# Patient Record
Sex: FEMALE | Race: OTHER | Employment: FULL TIME | URBAN - METROPOLITAN AREA
[De-identification: names, ages, dates, MRNs, and addresses within clinical notes are randomized per-mention and may not be internally consistent; named-entity substitution may affect disease eponyms.]

---

## 2019-04-27 ENCOUNTER — HOSPITAL ENCOUNTER (EMERGENCY)
Age: 34
Discharge: HOME | End: 2019-04-27
Attending: EMERGENCY MEDICINE
Payer: COMMERCIAL

## 2019-04-27 ENCOUNTER — APPOINTMENT (OUTPATIENT)
Dept: GENERAL RADIOLOGY | Age: 34
End: 2019-04-27
Attending: EMERGENCY MEDICINE
Payer: COMMERCIAL

## 2019-04-27 VITALS
HEIGHT: 64 IN | OXYGEN SATURATION: 98 % | TEMPERATURE: 98 F | HEART RATE: 78 BPM | BODY MASS INDEX: 32.44 KG/M2 | RESPIRATION RATE: 18 BRPM | DIASTOLIC BLOOD PRESSURE: 83 MMHG | WEIGHT: 190 LBS | SYSTOLIC BLOOD PRESSURE: 114 MMHG

## 2019-04-27 DIAGNOSIS — S93.401A MODERATE RIGHT ANKLE SPRAIN, INITIAL ENCOUNTER: Primary | ICD-10-CM

## 2019-04-27 PROCEDURE — 99283 EMERGENCY DEPT VISIT LOW MDM: CPT

## 2019-04-27 PROCEDURE — 73610 X-RAY EXAM OF ANKLE: CPT

## 2019-04-27 PROCEDURE — 74011250637 HC RX REV CODE- 250/637: Performed by: EMERGENCY MEDICINE

## 2019-04-27 PROCEDURE — L4350 ANKLE CONTROL ORTHO PRE OTS: HCPCS

## 2019-04-27 RX ORDER — NAPROXEN 500 MG/1
500 TABLET ORAL 2 TIMES DAILY WITH MEALS
Qty: 20 TAB | Refills: 0 | Status: SHIPPED | OUTPATIENT
Start: 2019-04-27 | End: 2020-01-13

## 2019-04-27 RX ORDER — HYDROCODONE BITARTRATE AND ACETAMINOPHEN 5; 325 MG/1; MG/1
1 TABLET ORAL
Qty: 15 TAB | Refills: 0 | Status: SHIPPED | OUTPATIENT
Start: 2019-04-27 | End: 2019-05-02

## 2019-04-27 RX ORDER — HYDROCODONE BITARTRATE AND ACETAMINOPHEN 5; 325 MG/1; MG/1
1 TABLET ORAL
Status: DISCONTINUED | OUTPATIENT
Start: 2019-04-27 | End: 2019-04-27 | Stop reason: SDUPTHER

## 2019-04-27 RX ORDER — HYDROCODONE BITARTRATE AND ACETAMINOPHEN 5; 325 MG/1; MG/1
1 TABLET ORAL
Status: COMPLETED | OUTPATIENT
Start: 2019-04-27 | End: 2019-04-27

## 2019-04-27 RX ADMIN — HYDROCODONE BITARTRATE AND ACETAMINOPHEN 1 TABLET: 5; 325 TABLET ORAL at 04:59

## 2019-04-27 NOTE — ED NOTES
Provider has reviewed discharge instructions with the patient and spouse. The patient verbalized understanding.

## 2019-04-27 NOTE — ED PROVIDER NOTES
The patient is a 79-year-old female without any significant past medical history, who presents to the ED for evaluation for right ankle pain and injury sustained yesterday. The patient slipped and fell on her right side, twisting her right ankle in the process. The patient was able to get back up and felt a sharp pain in the right lower extremity. However, she was able to ambulate. Throughout this evening, her discomfort worsen, and she hasn't been able to put any weight in her right ankle. She denies any headache, neck, and back pain, chest pain, shortness of breath, nausea, vomiting, abdominal pain, dizziness, weakness, and numbness. No past medical history on file. No past surgical history on file. No family history on file. Social History Socioeconomic History  Marital status:  Spouse name: Not on file  Number of children: Not on file  Years of education: Not on file  Highest education level: Not on file Occupational History  Not on file Social Needs  Financial resource strain: Not on file  Food insecurity:  
  Worry: Not on file Inability: Not on file  Transportation needs:  
  Medical: Not on file Non-medical: Not on file Tobacco Use  Smoking status: Not on file Substance and Sexual Activity  Alcohol use: Not on file  Drug use: Not on file  Sexual activity: Not on file Lifestyle  Physical activity:  
  Days per week: Not on file Minutes per session: Not on file  Stress: Not on file Relationships  Social connections:  
  Talks on phone: Not on file Gets together: Not on file Attends [employer] service: Not on file Active member of club or organization: Not on file Attends meetings of clubs or organizations: Not on file Relationship status: Not on file  Intimate partner violence:  
  Fear of current or ex partner: Not on file Emotionally abused: Not on file Physically abused: Not on file Forced sexual activity: Not on file Other Topics Concern  Not on file Social History Narrative  Not on file ALLERGIES: Patient has no allergy information on record. Review of Systems All other systems reviewed and are negative. Vitals:  
 04/27/19 9517 BP: 114/83 Pulse: 78 Resp: 18 Temp: 98 °F (36.7 °C) SpO2: 98% Weight: 86.2 kg (190 lb) Height: 5' 4\" (1.626 m) Physical Exam  
Musculoskeletal:  
     Right ankle: She exhibits decreased range of motion, swelling and ecchymosis. She exhibits no deformity, no laceration and normal pulse. Tenderness. Lateral malleolus tenderness found. No medial malleolus, no AITFL, no CF ligament, no posterior TFL, no head of 5th metatarsal and no proximal fibula tenderness found. Achilles tendon normal.  
     Feet: 
 
Nursing note and vitals reviewed. CONSTITUTIONAL: Well-appearing; well-nourished; in no apparent distress HEAD: Normocephalic; atraumatic EYES: PERRL; EOM intact; conjunctiva and sclera are clear bilaterally. ENT: No rhinorrhea; normal pharynx with no tonsillar hypertrophy; mucous membranes pink/moist, no erythema, no exudate. NECK: Supple; non-tender; no cervical lymphadenopathy CARD: Normal S1, S2; no murmurs, rubs, or gallops. Regular rate and rhythm. RESP: Normal respiratory effort; breath sounds clear and equal bilaterally; no wheezes, rhonchi, or rales. ABD: Normal bowel sounds; non-distended; non-tender; no palpable organomegaly, no masses, no bruits. Back Exam: Normal inspection; no vertebral point tenderness, no CVA tenderness. Normal range of motion. SKIN: Warm; dry; no rash. NEURO:Alert and oriented x 3, coherent, AUGUSTO-XII grossly intact, sensory and motor are non-focal. 
 
 
MDM Number of Diagnoses or Management Options Diagnosis management comments: Assessment: left ankle injury rule out fracture-suspect strain Plan: education, and reassurance, symptomatic treatment/ x-ray of the right ankle/ analgesia/ splint/ crutches/ Monitor and Reevaluate. Amount and/or Complexity of Data Reviewed Clinical lab tests: ordered and reviewed Tests in the radiology section of CPT®: ordered and reviewed Tests in the medicine section of CPT®: reviewed and ordered Discussion of test results with the performing providers: yes Decide to obtain previous medical records or to obtain history from someone other than the patient: yes Obtain history from someone other than the patient: yes Review and summarize past medical records: yes Discuss the patient with other providers: yes Independent visualization of images, tracings, or specimens: yes Risk of Complications, Morbidity, and/or Mortality Presenting problems: moderate Diagnostic procedures: moderate Management options: moderate Procedures XRAY INTERPRETATION (ED MD) Xray of Right ankle shows STS Moderatno fracture. No subluxation/dislocation. No bony abnormality. Dominick Blackburn MD 4:44 AM 
 
Splint applied by Oskar Griffin MD.  Neurovascular status intact post splint application. Progress Note:  
Pt has been reexamined by Oskar Griffin MD. Pt is feeling much better. Symptoms have improved. All available results have been reviewed with pt and any available family. Pt understands sx, dx, and tx in ED. Care plan has been outlined and questions have been answered. Pt is ready to go home. Will send home on right ankle sprain instructions. Prescription of naproxen, and norco. Splint and crutches instruction. Outpatient referral with PCP as needed. Written by Oskar Griffin MD,4:44 AM 
 
. Prakash Goldman

## 2019-04-27 NOTE — ED TRIAGE NOTES
Pt fell yesterday morning at 7am while wearing heels. All day some pain but was able to walk. Iced when got home and took Ibuprofen 600mg. Woke up to swollen ankle with more pain, unable to put pressure on it. Right ankle swollen with some redness.

## 2019-04-27 NOTE — DISCHARGE INSTRUCTIONS
Patient Education        Ankle Sprain: Care Instructions  Your Care Instructions    An ankle sprain can happen when you twist your ankle. The ligaments that support the ankle can get stretched and torn. Often the ankle is swollen and painful. Ankle sprains may take from several weeks to several months to heal. Usually, the more pain and swelling you have, the more severe your ankle sprain is and the longer it will take to heal. You can heal faster and regain strength in your ankle with good home treatment. It is very important to give your ankle time to heal completely, so that you do not easily hurt your ankle again. Follow-up care is a key part of your treatment and safety. Be sure to make and go to all appointments, and call your doctor if you are having problems. It's also a good idea to know your test results and keep a list of the medicines you take. How can you care for yourself at home? · Prop up your foot on pillows as much as possible for the next 3 days. Try to keep your ankle above the level of your heart. This will help reduce the swelling. · Follow your doctor's directions for wearing a splint or elastic bandage. Wrapping the ankle may help reduce or prevent swelling. · Your doctor may give you a splint, a brace, an air stirrup, or another form of ankle support to protect your ankle until it is healed. Wear it as directed while your ankle is healing. Do not remove it unless your doctor tells you to. After your ankle has healed, ask your doctor whether you should wear the brace when you exercise. · Put ice or cold packs on your injured ankle for 10 to 20 minutes at a time. Try to do this every 1 to 2 hours for the next 3 days (when you are awake) or until the swelling goes down. Put a thin cloth between the ice and your skin. · You may need to use crutches until you can walk without pain. If you do use crutches, try to bear some weight on your injured ankle if you can do so without pain.  This helps the ankle heal.  · Take pain medicines exactly as directed. ? If the doctor gave you a prescription medicine for pain, take it as prescribed. ? If you are not taking a prescription pain medicine, ask your doctor if you can take an over-the-counter medicine. · If you have been given ankle exercises to do at home, do them exactly as instructed. These can promote healing and help prevent lasting weakness. When should you call for help? Call your doctor now or seek immediate medical care if:    · Your pain is getting worse.     · Your swelling is getting worse.     · Your splint feels too tight or you are unable to loosen it.    Watch closely for changes in your health, and be sure to contact your doctor if:    · You are not getting better after 1 week. Where can you learn more? Go to http://milena-cristal.info/. Enter U964 in the search box to learn more about \"Ankle Sprain: Care Instructions. \"  Current as of: September 20, 2018  Content Version: 11.9  © 4840-0942 [employer]. Care instructions adapted under license by [employer] (which disclaims liability or warranty for this information). If you have questions about a medical condition or this instruction, always ask your healthcare professional. [address] any warranty or liability for your use of this information.

## 2019-04-27 NOTE — LETTER
[address] 
[facility] EMERGENCY DEPT 
[address] [place] [id number] [phone] Work/School Note Date: 4/27/2019 To Whom It May concern: 
 
Soheila Hammer was seen and treated today in the emergency room by the following provider(s): 
Attending Provider: Victorino Nolan MD.   
 
Soheila Hammer may return to work on 04/30/2019. Sincerely, Leo Santos MD 
 
 
 
 0.75

## 2020-01-19 ENCOUNTER — HOSPITAL ENCOUNTER (EMERGENCY)
Age: 35
Discharge: HOME | End: 2020-01-19
Attending: EMERGENCY MEDICINE | Admitting: EMERGENCY MEDICINE
Payer: COMMERCIAL

## 2020-01-19 VITALS
HEIGHT: 64 IN | DIASTOLIC BLOOD PRESSURE: 84 MMHG | RESPIRATION RATE: 16 BRPM | BODY MASS INDEX: 33.8 KG/M2 | OXYGEN SATURATION: 98 % | TEMPERATURE: 98.9 F | WEIGHT: 198 LBS | SYSTOLIC BLOOD PRESSURE: 140 MMHG | HEART RATE: 80 BPM

## 2020-01-19 DIAGNOSIS — M54.50 LUMBAR PAIN: Primary | ICD-10-CM

## 2020-01-19 LAB
APPEARANCE UR: CLEAR
BACTERIA URNS QL MICRO: NEGATIVE /HPF
BILIRUB UR QL: NEGATIVE
COLOR UR: ABNORMAL
EPITH CASTS URNS QL MICRO: ABNORMAL /LPF
GLUCOSE UR STRIP.AUTO-MCNC: >1000 MG/DL
HCG UR QL: NEGATIVE
HGB UR QL STRIP: NEGATIVE
HYALINE CASTS URNS QL MICRO: ABNORMAL /LPF (ref 0–5)
KETONES UR QL STRIP.AUTO: NEGATIVE MG/DL
LEUKOCYTE ESTERASE UR QL STRIP.AUTO: NEGATIVE
NITRITE UR QL STRIP.AUTO: NEGATIVE
PH UR STRIP: 6 [PH] (ref 5–8)
PROT UR STRIP-MCNC: NEGATIVE MG/DL
RBC #/AREA URNS HPF: ABNORMAL /HPF (ref 0–5)
SP GR UR REFRACTOMETRY: 1.01 (ref 1–1.03)
UR CULT HOLD, URHOLD: NORMAL
UROBILINOGEN UR QL STRIP.AUTO: 1 EU/DL (ref 0.2–1)
WBC URNS QL MICRO: ABNORMAL /HPF (ref 0–4)

## 2020-01-19 PROCEDURE — 74011250636 HC RX REV CODE- 250/636: Performed by: EMERGENCY MEDICINE

## 2020-01-19 PROCEDURE — 99283 EMERGENCY DEPT VISIT LOW MDM: CPT

## 2020-01-19 PROCEDURE — 81025 URINE PREGNANCY TEST: CPT

## 2020-01-19 PROCEDURE — 81001 URINALYSIS AUTO W/SCOPE: CPT

## 2020-01-19 PROCEDURE — 96372 THER/PROPH/DIAG INJ SC/IM: CPT

## 2020-01-19 RX ORDER — KETOROLAC TROMETHAMINE 30 MG/ML
60 INJECTION, SOLUTION INTRAMUSCULAR; INTRAVENOUS
Status: COMPLETED | OUTPATIENT
Start: 2020-01-19 | End: 2020-01-19

## 2020-01-19 RX ORDER — CYCLOBENZAPRINE HCL 10 MG
10 TABLET ORAL
Qty: 20 TAB | Refills: 0 | Status: SHIPPED | OUTPATIENT
Start: 2020-01-19 | End: 2020-03-03

## 2020-01-19 RX ORDER — DICLOFENAC SODIUM 75 MG/1
75 TABLET, DELAYED RELEASE ORAL 2 TIMES DAILY
Qty: 30 TAB | Refills: 0 | Status: SHIPPED | OUTPATIENT
Start: 2020-01-19 | End: 2021-04-02

## 2020-01-19 RX ADMIN — KETOROLAC TROMETHAMINE 60 MG: 30 INJECTION, SOLUTION INTRAMUSCULAR at 21:43

## 2020-01-20 NOTE — DISCHARGE INSTRUCTIONS
We hope that we have addressed all of your medical concerns. The examination and treatment you received in the Emergency Department were for an emergent problem and were not intended as complete care. It is important that you follow up with your healthcare provider(s) for ongoing care. If your symptoms worsen or do not improve as expected, and you are unable to reach your usual health care provider(s), you should return to the Emergency Department. Today's healthcare is undergoing tremendous change, and patient satisfaction surveys are one of the many tools to assess the quality of medical care. You may receive a survey from the [employer] regarding your experience in the Emergency Department. I hope that your experience has been completely positive, particularly the medical care that I provided. As such, please participate in the survey; anything less than excellent does not meet my expectations or intentions. [facility] and [address] [place] [address] participate in nationally recognized quality of care measures. If your blood pressure is greater than 120/80, as reported below, we urge that you seek medical care to address the potential of high blood pressure, commonly known as hypertension. Hypertension can be hereditary or can be caused by certain medical conditions, pain, stress, or \"white coat syndrome. \"       Please make an appointment with your health care provider(s) for follow up of your Emergency Department visit. VITALS:   Patient Vitals for the past 8 hrs:   Temp Pulse Resp BP SpO2   01/19/20 2105 98.9 °F (37.2 °C) 80 16 140/84 98 %          Thank you for allowing us to provide you with medical care today. We realize that you have many choices for your emergency care needs. Please choose us in the future for any continued health care needs. Chen Vazquez, [address]. Office: [phone]            Recent Results (from the past 24 hour(s))   URINALYSIS W/MICROSCOPIC    Collection Time: 01/19/20  9:45 PM   Result Value Ref Range    Color YELLOW/STRAW      Appearance CLEAR CLEAR      Specific gravity 1.010 1.003 - 1.030      pH (UA) 6.0 5.0 - 8.0      Protein NEGATIVE  NEG mg/dL    Glucose >1,000 (A) NEG mg/dL    Ketone NEGATIVE  NEG mg/dL    Bilirubin NEGATIVE  NEG      Blood NEGATIVE  NEG      Urobilinogen 1.0 0.2 - 1.0 EU/dL    Nitrites NEGATIVE  NEG      Leukocyte Esterase NEGATIVE  NEG      WBC 0-4 0 - 4 /hpf    RBC 0-5 0 - 5 /hpf    Epithelial cells FEW FEW /lpf    Bacteria NEGATIVE  NEG /hpf    Hyaline cast 0-2 0 - 5 /lpf   URINE CULTURE HOLD SAMPLE    Collection Time: 01/19/20  9:45 PM   Result Value Ref Range    Urine culture hold        URINE ON HOLD IN MICROBIOLOGY DEPT FOR 3 DAYS. IF UNPRESERVED URINE IS SUBMITTED, IT CANNOT BE USED FOR ADDITIONAL TESTING AFTER 24 HRS, RECOLLECTION WILL BE REQUIRED. HCG URINE, QL. - POC    Collection Time: 01/19/20  9:50 PM   Result Value Ref Range    Pregnancy test,urine (POC) NEGATIVE  NEG         No results found. Patient Education        Back Pain: Care Instructions  Your Care Instructions    Back pain has many possible causes. It is often related to problems with muscles and ligaments of the back. It may also be related to problems with the nerves, discs, or bones of the back. Moving, lifting, standing, sitting, or sleeping in an awkward way can strain the back. Sometimes you don't notice the injury until later. Arthritis is another common cause of back pain. Although it may hurt a lot, back pain usually improves on its own within several weeks. Most people recover in 12 weeks or less. Using good home treatment and being careful not to stress your back can help you feel better sooner. Follow-up care is a key part of your treatment and safety.  Be sure to make and go to all appointments, and call your doctor if you are having problems. It's also a good idea to know your test results and keep a list of the medicines you take. How can you care for yourself at home? · Sit or lie in positions that are most comfortable and reduce your pain. Try one of these positions when you lie down:  ? Lie on your back with your knees bent and supported by large pillows. ? Lie on the floor with your legs on the seat of a sofa or chair. ? Lie on your side with your knees and hips bent and a pillow between your legs. ? Lie on your stomach if it does not make pain worse. · Do not sit up in bed, and avoid soft couches and twisted positions. Bed rest can help relieve pain at first, but it delays healing. Avoid bed rest after the first day of back pain. · Change positions every 30 minutes. If you must sit for long periods of time, take breaks from sitting. Get up and walk around, or lie in a comfortable position. · Try using a heating pad on a low or medium setting for 15 to 20 minutes every 2 or 3 hours. Try a warm shower in place of one session with the heating pad. · You can also try an ice pack for 10 to 15 minutes every 2 to 3 hours. Put a thin cloth between the ice pack and your skin. · Take pain medicines exactly as directed. ? If the doctor gave you a prescription medicine for pain, take it as prescribed. ? If you are not taking a prescription pain medicine, ask your doctor if you can take an over-the-counter medicine. · Take short walks several times a day. You can start with 5 to 10 minutes, 3 or 4 times a day, and work up to longer walks. Walk on level surfaces and avoid hills and stairs until your back is better. · Return to work and other activities as soon as you can. Continued rest without activity is usually not good for your back. · To prevent future back pain, do exercises to stretch and strengthen your back and stomach. Learn how to use good posture, safe lifting techniques, and proper body mechanics.   When should you call for help? Call your doctor now or seek immediate medical care if:    · You have new or worsening numbness in your legs.     · You have new or worsening weakness in your legs. (This could make it hard to stand up.)     · You lose control of your bladder or bowels.    Watch closely for changes in your health, and be sure to contact your doctor if:    · You have a fever, lose weight, or don't feel well.     · You do not get better as expected. Where can you learn more? Go to http://milena-cristal.info/. Enter O939 in the search box to learn more about \"Back Pain: Care Instructions. \"  Current as of: June 26, 2019  Content Version: 12.2  © 9041-9732 [employer]. Care instructions adapted under license by [employer] (which disclaims liability or warranty for this information). If you have questions about a medical condition or this instruction, always ask your healthcare professional. [place] [address] any warranty or liability for your use of this information. Patient Education        Low Back Pain: Exercises  Introduction  Here are some examples of exercises for you to try. The exercises may be suggested for a condition or for rehabilitation. Start each exercise slowly. Ease off the exercises if you start to have pain. You will be told when to start these exercises and which ones will work best for you. How to do the exercises  Press-up    1. Lie on your stomach, supporting your body with your forearms. 2. Press your elbows down into the floor to raise your upper back. As you do this, relax your stomach muscles and allow your back to arch without using your back muscles. As your press up, do not let your hips or pelvis come off the floor. 3. Hold for 15 to 30 seconds, then relax. 4. Repeat 2 to 4 times. Alternate arm and leg (bird dog) exercise    1. Start on the floor, on your hands and knees. 2. Tighten your belly muscles.   3. Raise one leg off the floor, and hold it straight out behind you. Be careful not to let your hip drop down, because that will twist your trunk. 4. Hold for about 6 seconds, then lower your leg and switch to the other leg. 5. Repeat 8 to 12 times on each leg. 6. Over time, work up to holding for 10 to 30 seconds each time. 7. If you feel stable and secure with your leg raised, try raising the opposite arm straight out in front of you at the same time. Knee-to-chest exercise    1. Lie on your back with your knees bent and your feet flat on the floor. 2. Bring one knee to your chest, keeping the other foot flat on the floor (or keeping the other leg straight, whichever feels better on your lower back). 3. Keep your lower back pressed to the floor. Hold for at least 15 to 30 seconds. 4. Relax, and lower the knee to the starting position. 5. Repeat with the other leg. Repeat 2 to 4 times with each leg. 6. To get more stretch, put your other leg flat on the floor while pulling your knee to your chest.    Curl-ups    1. Lie on the floor on your back with your knees bent at a 90-degree angle. Your feet should be flat on the floor, about 12 inches from your buttocks. 2. Cross your arms over your chest. If this bothers your neck, try putting your hands behind your neck (not your head), with your elbows spread apart. 3. Slowly tighten your belly muscles and raise your shoulder blades off the floor. 4. Keep your head in line with your body, and do not press your chin to your chest.  5. Hold this position for 1 or 2 seconds, then slowly lower yourself back down to the floor. 6. Repeat 8 to 12 times. Pelvic tilt exercise    1. Lie on your back with your knees bent. 2. \"Brace\" your stomach. This means to tighten your muscles by pulling in and imagining your belly button moving toward your spine. You should feel like your back is pressing to the floor and your hips and pelvis are rocking back.   3. Hold for about 6 seconds while you breathe smoothly. 4. Repeat 8 to 12 times. Heel dig bridging    1. Lie on your back with both knees bent and your ankles bent so that only your heels are digging into the floor. Your knees should be bent about 90 degrees. 2. Then push your heels into the floor, squeeze your buttocks, and lift your hips off the floor until your shoulders, hips, and knees are all in a straight line. 3. Hold for about 6 seconds as you continue to breathe normally, and then slowly lower your hips back down to the floor and rest for up to 10 seconds. 4. Do 8 to 12 repetitions. Hamstring stretch in doorway    1. Lie on your back in a doorway, with one leg through the open door. 2. Slide your leg up the wall to straighten your knee. You should feel a gentle stretch down the back of your leg. 3. Hold the stretch for at least 15 to 30 seconds. Do not arch your back, point your toes, or bend either knee. Keep one heel touching the floor and the other heel touching the wall. 4. Repeat with your other leg. 5. Do 2 to 4 times for each leg. Hip flexor stretch    1. Kneel on the floor with one knee bent and one leg behind you. Place your forward knee over your foot. Keep your other knee touching the floor. 2. Slowly push your hips forward until you feel a stretch in the upper thigh of your rear leg. 3. Hold the stretch for at least 15 to 30 seconds. Repeat with your other leg. 4. Do 2 to 4 times on each side. Wall sit    1. Stand with your back 10 to 12 inches away from a wall. 2. Lean into the wall until your back is flat against it. 3. Slowly slide down until your knees are slightly bent, pressing your lower back into the wall. 4. Hold for about 6 seconds, then slide back up the wall. 5. Repeat 8 to 12 times. Follow-up care is a key part of your treatment and safety. Be sure to make and go to all appointments, and call your doctor if you are having problems.  It's also a good idea to know your test results and keep a list of the medicines you take. Where can you learn more? Go to http://milena-cristal.info/. Enter I012 in the search box to learn more about \"Low Back Pain: Exercises. \"  Current as of: June 26, 2019  Content Version: 12.2  © 9736-3606 [employer]. Care instructions adapted under license by [employer] (which disclaims liability or warranty for this information). If you have questions about a medical condition or this instruction, always ask your healthcare professional. [address] any warranty or liability for your use of this information.

## 2020-01-20 NOTE — ED NOTES
Patient given discharge instructions per provider, patient verbalized understanding and ambulatory from ED to home with daughter.

## 2020-01-20 NOTE — ED PROVIDER NOTES
27 y.o. female with past medical history significant for DM, and HTN who presents from home via self with chief complaint of back pain. Patient states she began with lower thoracic, and lumbar spine tenderness. Patient states her pain is worse with moving and inspiration. Patient denies any hematuria, dysuria, coughing, chest pain, shortness of breath, and numbness/tingling in the legs. There are no other acute medical concerns at this time. Social hx: Never Smoker, No current EtOH use, Never illicit drug use  PCP: Kera Simon NP      Note written by Janeen Bolaños, as dictated by Monica Harvey, DO 9:16 PM      The history is provided by the patient. No  was used. Past Medical History:   Diagnosis Date    Diabetes ([facility] [address]     Hypertension        Past Surgical History:   Procedure Laterality Date    HX BILATERAL SALPINGECTOMY           No family history on file.     Social History     Socioeconomic History    Marital status:      Spouse name: Not on file    Number of children: Not on file    Years of education: Not on file    Highest education level: Not on file   Occupational History    Not on file   Social Needs    Financial resource strain: Not on file    Food insecurity:     Worry: Not on file     Inability: Not on file    Transportation needs:     Medical: Not on file     Non-medical: Not on file   Tobacco Use    Smoking status: Never Smoker    Smokeless tobacco: Never Used   Substance and Sexual Activity    Alcohol use: Not Currently    Drug use: Never    Sexual activity: Not on file   Lifestyle    Physical activity:     Days per week: Not on file     Minutes per session: Not on file    Stress: Not on file   Relationships    Social connections:     Talks on phone: Not on file     Gets together: Not on file     Attends [employer] service: Not on file     Active member of club or organization: Not on file     Attends meetings of clubs or organizations: Not on file     Relationship status: Not on file    Intimate partner violence:     Fear of current or ex partner: Not on file     Emotionally abused: Not on file     Physically abused: Not on file     Forced sexual activity: Not on file   Other Topics Concern    Not on file   Social History Narrative    Not on file         ALLERGIES: Patient has no known allergies. Review of Systems   Constitutional: Negative for appetite change, chills, fever and unexpected weight change. HENT: Negative for ear pain, hearing loss, rhinorrhea and trouble swallowing. Eyes: Negative for pain and visual disturbance. Respiratory: Negative for cough, chest tightness and shortness of breath. Cardiovascular: Negative for chest pain and palpitations. Gastrointestinal: Negative for abdominal distention, abdominal pain, blood in stool and vomiting. Genitourinary: Negative for dysuria, hematuria and urgency. Musculoskeletal: Positive for back pain. Negative for myalgias. Skin: Negative for rash. Neurological: Negative for dizziness, syncope, weakness and numbness. Psychiatric/Behavioral: Negative for confusion and suicidal ideas. All other systems reviewed and are negative. Vitals:    01/19/20 2105   BP: 140/84   Pulse: 80   Resp: 16   Temp: 98.9 °F (37.2 °C)   SpO2: 98%   Weight: 89.8 kg (198 lb)   Height: 5' 4\" (1.626 m)            Physical Exam  Vitals signs and nursing note reviewed. Constitutional:       General: She is not in acute distress. Appearance: She is well-developed. She is not diaphoretic. HENT:      Head: Normocephalic and atraumatic. Right Ear: External ear normal.      Left Ear: External ear normal.      Nose: Nose normal.      Mouth/Throat:      Pharynx: No oropharyngeal exudate. Eyes:      General: No scleral icterus. Right eye: No discharge. Left eye: No discharge.       Conjunctiva/sclera: Conjunctivae normal.      Pupils: Pupils are equal, round, and reactive to light. Neck:      Musculoskeletal: Normal range of motion and neck supple. Vascular: No JVD. Trachea: No tracheal deviation. Cardiovascular:      Rate and Rhythm: Normal rate and regular rhythm. Heart sounds: Normal heart sounds. No murmur. No friction rub. No gallop. Pulmonary:      Effort: Pulmonary effort is normal. No respiratory distress. Breath sounds: Normal breath sounds. No stridor. No decreased breath sounds, wheezing, rhonchi or rales. Chest:      Chest wall: No tenderness. Abdominal:      General: Bowel sounds are normal. There is no distension. Palpations: Abdomen is soft. Tenderness: There is no tenderness. There is no guarding or rebound. Musculoskeletal: Normal range of motion. General: Tenderness present. Thoracic back: She exhibits tenderness. Lumbar back: She exhibits tenderness. Comments: Tenderness to the lower thoracic and upper lumbar left paraspinal muscles   Skin:     General: Skin is warm and dry. Capillary Refill: Capillary refill takes less than 2 seconds. Coloration: Skin is not pale. Findings: No erythema or rash. Neurological:      General: No focal deficit present. Mental Status: She is alert and oriented to person, place, and time. GCS: GCS eye subscore is 4. GCS verbal subscore is 5. GCS motor subscore is 6. Cranial Nerves: No cranial nerve deficit. Sensory: Sensation is intact. No sensory deficit. Motor: Motor function is intact. No weakness or abnormal muscle tone. Coordination: Coordination normal.      Deep Tendon Reflexes: Reflexes are normal and symmetric. Reflexes normal.      Comments: Bilateral lower extremities are neurovascular intact. Psychiatric:         Behavior: Behavior normal.         Thought Content:  Thought content normal.         Judgment: Judgment normal.      Note written by Janeen Winter, as dictated by Danny Kelley Rogelio Guzman DO 9:16 PM      MDM  Number of Diagnoses or Management Options  Lumbar pain:      Amount and/or Complexity of Data Reviewed  Clinical lab tests: ordered and reviewed    Risk of Complications, Morbidity, and/or Mortality  Presenting problems: moderate  Diagnostic procedures: low  Management options: moderate    Patient Progress  Patient progress: stable       Procedures    Chief Complaint   Patient presents with    Back Pain       The patient's presenting problems have been discussed, and they are in agreement with the care plan formulated and outlined with them. I have encouraged them to ask questions as they arise throughout their visit. MEDICATIONS GIVEN:  Medications   ketorolac tromethamine (TORADOL) 60 mg/2 mL injection 60 mg (60 mg IntraMUSCular Given 1/19/20 6085)       LABS REVIEWED:  Recent Results (from the past 24 hour(s))   URINALYSIS W/MICROSCOPIC    Collection Time: 01/19/20  9:45 PM   Result Value Ref Range    Color YELLOW/STRAW      Appearance CLEAR CLEAR      Specific gravity 1.010 1.003 - 1.030      pH (UA) 6.0 5.0 - 8.0      Protein NEGATIVE  NEG mg/dL    Glucose >1,000 (A) NEG mg/dL    Ketone NEGATIVE  NEG mg/dL    Bilirubin NEGATIVE  NEG      Blood NEGATIVE  NEG      Urobilinogen 1.0 0.2 - 1.0 EU/dL    Nitrites NEGATIVE  NEG      Leukocyte Esterase NEGATIVE  NEG      WBC 0-4 0 - 4 /hpf    RBC 0-5 0 - 5 /hpf    Epithelial cells FEW FEW /lpf    Bacteria NEGATIVE  NEG /hpf    Hyaline cast 0-2 0 - 5 /lpf   URINE CULTURE HOLD SAMPLE    Collection Time: 01/19/20  9:45 PM   Result Value Ref Range    Urine culture hold        URINE ON HOLD IN MICROBIOLOGY DEPT FOR 3 DAYS. IF UNPRESERVED URINE IS SUBMITTED, IT CANNOT BE USED FOR ADDITIONAL TESTING AFTER 24 HRS, RECOLLECTION WILL BE REQUIRED.    HCG URINE, QL. - POC    Collection Time: 01/19/20  9:50 PM   Result Value Ref Range    Pregnancy test,urine (POC) NEGATIVE  NEG         VITAL SIGNS:  Patient Vitals for the past 24 hrs:   Temp Pulse Resp BP SpO2   01/19/20 2105 98.9 °F (37.2 °C) 80 16 140/84 98 %       RADIOLOGY RESULTS:  The following have been ordered and reviewed:  No results found. PROGRESS NOTES:  Discussed results and plan with patient. Patient will be discharged home with PCP follow up. Patient instructed to return to the emergency room for any worsening symptoms or any other concerns. DIAGNOSIS:    1. Lumbar pain        PLAN:  Follow-up Information     Follow up With Specialties Details Why Contact Info    SURYA Cruz Nurse Practitioner Schedule an appointment as soon as possible for a visit  [address] [facility]  [phone]      [facility] EMERGENCY DEPT Emergency Medicine  If symptoms worsen [address] [facility]  [phone]        Discharge Medication List as of 1/19/2020 10:03 PM      START taking these medications    Details   diclofenac EC (VOLTAREN) 75 mg EC tablet Take 1 Tab by mouth two (2) times a day., Print, Disp-30 Tab, R-0      cyclobenzaprine (FLEXERIL) 10 mg tablet Take 1 Tab by mouth three (3) times daily as needed for Muscle Spasm(s). , Print, Disp-20 Tab, R-0         CONTINUE these medications which have NOT CHANGED    Details   lisinopril (PRINIVIL, ZESTRIL) 5 mg tablet Take 5 mg by mouth daily. , Historical Med      metFORMIN (GLUCOPHAGE) 1,000 mg tablet Take 2,000 mg by mouth two (2) times a day., Historical Med      insulin glargine (LANTUS U-100 INSULIN) 100 unit/mL injection 26 Units by SubCUTAneous route nightly., Historical Med             ED COURSE: The patient's hospital course has been uncomplicated.

## 2020-01-20 NOTE — ED TRIAGE NOTES
Patient arrives with complaints of mid to lower left side back pain     Attempted warm bath with epsom salts and application of ice for pain relief, denies taking medications

## 2020-02-28 ENCOUNTER — HOSPITAL ENCOUNTER (EMERGENCY)
Age: 35
Discharge: HOME | End: 2020-02-28
Attending: EMERGENCY MEDICINE
Payer: COMMERCIAL

## 2020-02-28 VITALS
BODY MASS INDEX: 33.97 KG/M2 | HEIGHT: 64 IN | TEMPERATURE: 99.3 F | HEART RATE: 95 BPM | WEIGHT: 199 LBS | SYSTOLIC BLOOD PRESSURE: 132 MMHG | OXYGEN SATURATION: 99 % | DIASTOLIC BLOOD PRESSURE: 74 MMHG | RESPIRATION RATE: 16 BRPM

## 2020-02-28 DIAGNOSIS — J45.21 MILD INTERMITTENT REACTIVE AIRWAY DISEASE WITH ACUTE EXACERBATION: Primary | ICD-10-CM

## 2020-02-28 PROCEDURE — 94640 AIRWAY INHALATION TREATMENT: CPT

## 2020-02-28 PROCEDURE — 74011250636 HC RX REV CODE- 250/636: Performed by: NURSE PRACTITIONER

## 2020-02-28 PROCEDURE — 99283 EMERGENCY DEPT VISIT LOW MDM: CPT

## 2020-02-28 PROCEDURE — 74011000250 HC RX REV CODE- 250: Performed by: NURSE PRACTITIONER

## 2020-02-28 RX ORDER — DEXAMETHASONE 4 MG/1
10 TABLET ORAL
Status: COMPLETED | OUTPATIENT
Start: 2020-02-28 | End: 2020-02-28

## 2020-02-28 RX ORDER — DEXAMETHASONE 4 MG/1
TABLET ORAL
Qty: 2 TAB | Refills: 0 | Status: SHIPPED | OUTPATIENT
Start: 2020-02-28 | End: 2021-04-02

## 2020-02-28 RX ORDER — ALBUTEROL SULFATE 90 UG/1
1 AEROSOL, METERED RESPIRATORY (INHALATION)
Qty: 1 INHALER | Refills: 0 | Status: SHIPPED | OUTPATIENT
Start: 2020-02-28

## 2020-02-28 RX ADMIN — ALBUTEROL SULFATE 1 DOSE: 2.5 SOLUTION RESPIRATORY (INHALATION) at 19:59

## 2020-02-28 RX ADMIN — ALBUTEROL SULFATE 1 DOSE: 2.5 SOLUTION RESPIRATORY (INHALATION) at 18:40

## 2020-02-28 RX ADMIN — DEXAMETHASONE 10 MG: 4 TABLET ORAL at 18:37

## 2020-02-28 NOTE — ED PROVIDER NOTES
This is a 70-year-old female who presents ambulatory to the emergency room with complaints of asthma exacerbation. Patient states 3 weeks ago she started having difficulty breathing. Went to her urgent care who prescribed her an inhaler and steroids. Patient has finished the steroid treatment and continues to use her inhaler. Patient states today she has had increasing difficulty of breathing, coughing. This prompted an emergency room visit. Patient states she used her inhaler about 20 minutes prior to arrival with minimal help of symptoms. Denies chest pain, dizziness, nausea or vomiting, fever or chills, congestion, sinus pain or pressure. There are no further complaints at this time. Of note: Patient recently was in [place] and states she feels as though the [place] of weather was a trigger. \"    SURYA Worthington  Past Medical History:  No date: Asthma  No date: Diabetes ([facility] [address]  No date: Hypertension  Past Surgical History:  No date: HX BILATERAL SALPINGECTOMY             Past Medical History:   Diagnosis Date    Asthma     Diabetes ([facility] [address]     Hypertension        Past Surgical History:   Procedure Laterality Date    HX BILATERAL SALPINGECTOMY           History reviewed. No pertinent family history.     Social History     Socioeconomic History    Marital status:      Spouse name: Not on file    Number of children: Not on file    Years of education: Not on file    Highest education level: Not on file   Occupational History    Not on file   Social Needs    Financial resource strain: Not on file    Food insecurity:     Worry: Not on file     Inability: Not on file    Transportation needs:     Medical: Not on file     Non-medical: Not on file   Tobacco Use    Smoking status: Never Smoker    Smokeless tobacco: Never Used   Substance and Sexual Activity    Alcohol use: Not Currently    Drug use: Never    Sexual activity: Not on file   Lifestyle    Physical activity:     Days per week: Not on file     Minutes per session: Not on file    Stress: Not on file   Relationships    Social connections:     Talks on phone: Not on file     Gets together: Not on file     Attends [employer] service: Not on file     Active member of club or organization: Not on file     Attends meetings of clubs or organizations: Not on file     Relationship status: Not on file    Intimate partner violence:     Fear of current or ex partner: Not on file     Emotionally abused: Not on file     Physically abused: Not on file     Forced sexual activity: Not on file   Other Topics Concern    Not on file   Social History Narrative    Not on file         ALLERGIES: Patient has no known allergies. Review of Systems   Constitutional: Negative for appetite change, chills, diaphoresis, fatigue and fever. HENT: Negative for congestion, ear discharge, ear pain, sinus pressure, sinus pain, sore throat and trouble swallowing. Eyes: Negative for photophobia, pain, redness and visual disturbance. Respiratory: Positive for cough and wheezing. Negative for chest tightness and shortness of breath. Cardiovascular: Negative for chest pain and palpitations. Gastrointestinal: Negative for abdominal distention, abdominal pain, nausea and vomiting. Endocrine: Negative. Genitourinary: Negative for difficulty urinating, flank pain, frequency and urgency. Musculoskeletal: Negative for back pain, neck pain and neck stiffness. Skin: Negative for color change, pallor, rash and wound. Allergic/Immunologic: Negative. Neurological: Negative for dizziness, speech difficulty, weakness and headaches. Hematological: Does not bruise/bleed easily. Psychiatric/Behavioral: Negative for behavioral problems. The patient is not nervous/anxious.         Vitals:    02/28/20 1753 02/28/20 1817   BP: 132/74    Pulse: 88 95   Resp: 16    Temp: 99.3 °F (37.4 °C)    SpO2: 99% 99%   Weight: 90.3 kg (199 lb)    Height: 5' 4\" (1.626 m) Physical Exam  Vitals signs and nursing note reviewed. Constitutional:       General: She is not in acute distress. Appearance: She is well-developed. HENT:      Head: Normocephalic and atraumatic. Right Ear: External ear normal.      Left Ear: External ear normal.      Nose: Nose normal.   Eyes:      General:         Right eye: No discharge. Left eye: No discharge. Conjunctiva/sclera: Conjunctivae normal.      Pupils: Pupils are equal, round, and reactive to light. Neck:      Musculoskeletal: Normal range of motion and neck supple. Vascular: No JVD. Trachea: No tracheal deviation. Cardiovascular:      Rate and Rhythm: Normal rate and regular rhythm. Heart sounds: Normal heart sounds. No murmur. No gallop. Pulmonary:      Effort: No respiratory distress. Breath sounds: Wheezing present. No rales. Comments: Diminished breath sounds    Chest:      Chest wall: No tenderness. Abdominal:      General: Bowel sounds are normal. There is no distension. Palpations: Abdomen is soft. Tenderness: There is no abdominal tenderness. There is no guarding or rebound. Genitourinary:     Comments: Negative    Musculoskeletal: Normal range of motion. General: No tenderness. Skin:     General: Skin is warm and dry. Capillary Refill: Capillary refill takes less than 2 seconds. Coloration: Skin is not pale. Findings: No erythema or rash. Neurological:      Mental Status: She is alert and oriented to person, place, and time. Motor: No weakness. Coordination: Coordination normal.   Psychiatric:         Mood and Affect: Mood normal.         Behavior: Behavior normal.         Thought Content:  Thought content normal.         Judgment: Judgment normal.          MDM  Number of Diagnoses or Management Options  Mild intermittent reactive airway disease with acute exacerbation: new and requires workup  Diagnosis management comments: No indication for imaging. Patient afebrile and low suspicion of pneumonia. Discharged home and follow-up with PCP. Symptoms improved. Additional dose of Decadron, pro-air inhaler ordered for patient as an outpatient. Return to the emergency room with worsening symptoms  Patient in agreement with plan of care. 8:12 PM  Pt has been reexamined. Pt has no new complaints, changes or physical findings. Care plan outlined and precautions discussed. All available results were reviewed with pt. All medications were reviewed with pt. All of pt's questions and concerns were addressed. Pt agrees to F/U as instructed and agrees to return to ED upon further deterioration. Pt is ready to go home.   Gaby Alvarez NP      Procedures

## 2020-02-28 NOTE — ED TRIAGE NOTES
Pt reports SOB and wheezing x 3 weeks. Pt went to her PCP and was given nebs and steroids but pt continues to feel SOB.  + dry cough. No fevers.

## 2020-02-29 NOTE — DISCHARGE INSTRUCTIONS
Patient Education     Learning About Asthma Triggers  What are triggers? When you have asthma, certain things can make your symptoms worse. These are called triggers. They include:  · Cigarette smoke or air pollution. · Things you are allergic to, such as:  ¨ Pollen, mold, or dust mites. ¨ Pet hair, skin, or saliva. · Illnesses, like colds, flu, or pneumonia. · Exercise. · Dry, cold air. How do triggers affect asthma? Triggers can make it harder for your lungs to work as they should and can lead to sudden difficulty breathing and other symptoms. When you are around a trigger, an asthma attack is more likely. If your symptoms are severe, you may need emergency treatment or have to go to the hospital for treatment. If you know what your triggers are and can avoid them, you may be able to prevent asthma attacks, reduce how often you have them, and make them less severe. What can you do to avoid triggers? The first thing is to know your triggers. When you are having symptoms, note the things around you that might be causing them. Then look for patterns in what may be triggering your symptoms. Record your triggers on a piece of paper or in an asthma diary. When you have your list of possible triggers, work with your doctor to find ways to avoid them. You also can check how well your lungs are working by measuring your peak expiratory flow (PEF) throughout the day. Your PEF may drop when you are near things that trigger symptoms. Here are some ways to avoid a few common triggers. · Do not smoke or allow others to smoke around you. If you need help quitting, talk to your doctor about stop-smoking programs and medicines. These can increase your chances of quitting for good. · If there is a lot of pollution, pollen, or dust outside, stay at home and keep your windows closed. Use an air conditioner or air filter in your home.  Check your local weather report or newspaper for air quality and pollen reports. · Get a flu shot every year. Talk to your doctor about getting a pneumococcal shot. Wash your hands often to prevent infections. · Avoid exercising outdoors in cold weather. If you are outdoors in cold weather, wear a scarf around your face and breathe through your nose. How can you manage an asthma attack? · If you have an asthma action plan, follow the plan. In general:  ¨ Use your quick-relief inhaler as directed by your doctor. If your symptoms do not get better after you use your medicine, have someone take you to the emergency room. Call an ambulance if needed. ¨ If your doctor has given you other inhaled medicines or steroid pills, take them as directed. Where can you learn more? Go to [employer]  Enter M564 in the search box to learn more about \"Learning About Asthma Triggers. \"   © 3013-0515 [employer]. Care instructions adapted under license by [employer] (which disclaims liability or warranty for this information). This care instruction is for use with your licensed healthcare professional. If you have questions about a medical condition or this instruction, always ask your healthcare professional. [place] [address] any warranty or liability for your use of this information. Content Version: 80.3.038360;  Last Revised: February 23, 2012

## 2020-02-29 NOTE — ED NOTES
Bedside and Verbal shift change report given to Frankie Arambula RN (oncoming nurse) by Richard Barnes RN (offgoing nurse). Report included the following information SBAR, Kardex, ED Summary and Recent Results.

## 2021-04-02 ENCOUNTER — HOSPITAL ENCOUNTER (OUTPATIENT)
Dept: PREADMISSION TESTING | Age: 36
Discharge: HOME | End: 2021-04-02
Payer: COMMERCIAL

## 2021-04-02 VITALS
WEIGHT: 207.5 LBS | TEMPERATURE: 98.2 F | HEIGHT: 64 IN | HEART RATE: 86 BPM | BODY MASS INDEX: 35.42 KG/M2 | SYSTOLIC BLOOD PRESSURE: 116 MMHG | RESPIRATION RATE: 16 BRPM | DIASTOLIC BLOOD PRESSURE: 70 MMHG | OXYGEN SATURATION: 95 %

## 2021-04-02 LAB
ANION GAP SERPL CALC-SCNC: 7 MMOL/L (ref 5–15)
ATRIAL RATE: 77 BPM
BUN SERPL-MCNC: 13 MG/DL (ref 6–20)
BUN/CREAT SERPL: 21 (ref 12–20)
CALCIUM SERPL-MCNC: 8.8 MG/DL (ref 8.5–10.1)
CALCULATED P AXIS, ECG09: 16 DEGREES
CALCULATED R AXIS, ECG10: 8 DEGREES
CALCULATED T AXIS, ECG11: -7 DEGREES
CHLORIDE SERPL-SCNC: 102 MMOL/L (ref 97–108)
CO2 SERPL-SCNC: 28 MMOL/L (ref 21–32)
CREAT SERPL-MCNC: 0.62 MG/DL (ref 0.55–1.02)
DIAGNOSIS, 93000: NORMAL
GLUCOSE SERPL-MCNC: 274 MG/DL (ref 65–100)
P-R INTERVAL, ECG05: 138 MS
POTASSIUM SERPL-SCNC: 4.1 MMOL/L (ref 3.5–5.1)
Q-T INTERVAL, ECG07: 362 MS
QRS DURATION, ECG06: 78 MS
QTC CALCULATION (BEZET), ECG08: 409 MS
SODIUM SERPL-SCNC: 137 MMOL/L (ref 136–145)
VENTRICULAR RATE, ECG03: 77 BPM

## 2021-04-02 PROCEDURE — 93005 ELECTROCARDIOGRAM TRACING: CPT

## 2021-04-02 PROCEDURE — 36415 COLL VENOUS BLD VENIPUNCTURE: CPT

## 2021-04-02 PROCEDURE — 80048 BASIC METABOLIC PNL TOTAL CA: CPT

## 2021-04-02 RX ORDER — PANTOPRAZOLE SODIUM 40 MG/1
40 TABLET, DELAYED RELEASE ORAL DAILY
COMMUNITY

## 2021-04-02 RX ORDER — DICYCLOMINE HYDROCHLORIDE 10 MG/1
10 CAPSULE ORAL 2 TIMES DAILY
COMMUNITY

## 2021-04-02 NOTE — PERIOP NOTES
[address] [address] [facility]   PRE-ADMISSION TESTING    [phone]     Surgery Date:  4/8/2021 Thursday      Is surgery arrival time given by surgeon? NO  If NO, [facility] staff will call you between 3 and 7pm the day before your surgery with your arrival time. (If your surgery is on a Monday, we will call you the Friday before.)    Call [phone] after 7pm Monday-Friday if you did not receive this call. INSTRUCTIONS BEFORE YOUR SURGERY   When You  Arrive Arrive at the [address] [facility] on the day of your surgery  Have your insurance card, photo ID, and any copayment (if needed)   Food   and   Drink NO food or drink after midnight the night before surgery    This means NO water, gum, mints, coffee, juice, etc.  No alcohol (beer, wine, liquor) 24 hours before and after surgery   Medications to   TAKE   Morning of Surgery MEDICATIONS TO TAKE THE MORNING OF SURGERY WITH A SIP OF WATER:    Pantoprazole   Dicyclomine    Call your PCP for instructions regarding your Basaglar and Victoza. They may want you to alter your dosing since you wont be eating or drinking anything after midnight the night prior to surgery. Please bring your Albuterol inhaler with you to the hospital.     Medications  To  STOP      7 days before surgery  Non-Steroidal anti-inflammatory Drugs (NSAID's): for example, Ibuprofen (Advil, Motrin), Naproxen (Aleve)   Aspirin, if taking for pain    Herbal supplements, vitamins, and fish oil   Other:  (Pain medications not listed above, including Tylenol may be taken)   Blood  Thinners  If you take  Aspirin, Plavix, Coumadin, or any blood-thinning or anti-blood clot medicine, talk to the doctor who prescribed the medications for pre-operative instructions.    Bathing Clothing  Jewelry  Valuables      If you shower the morning of surgery, please do not apply anything to your skin (lotions, powders, deodorant, or makeup, especially cheryl)   Follow Chlorhexidine Care Fusion body wash instructions provided to you during PAT appointment. Begin 3 days prior to surgery.  Do not shave or trim anywhere 24 hours before surgery   Wear your hair loose or down; no pony-tails, buns, or metal hair clips   Wear loose, comfortable, clean clothes   Wear glasses instead of contacts   Leave money, valuables, and jewelry, including body piercings, at home   Going Home - or Spending the Night  SAME-DAY SURGERY: You must have a responsible adult drive you home and stay with you 24 hours after surgery   ADMITS: If your doctor is keeping you in the hospital after surgery, leave personal belongings/luggage in your car until you have a hospital room number. Hospital discharge time is 12 noon  Drivers must be here before 12 noon unless you are told differently   Special Instructions It is now mandated that all surgical patients be tested for COVID-19 prior to surgery. Testing has to be exactly 4 days prior to surgery. Your COVID test date is Sunday, 4/4/2021 between 8:00 am and 11:00 am.     COVID testing will be performed curbside at the [facility] in [address]. There will be signs leading you to the testing site. You will need to bring a photo ID with you to be swabbed. Patients are advised to self-quarantine at home after testing and prior to your surgery date. You will be notified if your results are positive.     What to watch for:   Coronavirus (COVID-19) affects different people in different ways   It also appears with a wide range of symptoms from mild to severe   Signs usually appear 2-14 days after exposure     If you develop any of the following, notify your doctor immediately:  o Fever  o Chills, with or without a shiver  o Muscle pain  o Headache  o Sore throat  o Dry cough  o New loss of taste or smell  o Tiredness      If you develop any of the following, call 911:  o Shortness of breath  o Difficulty breathing  o Chest pain  o New confusion  o Blueness of fingers and/or lips    Check your blood sugar the morning of surgery: if it is low you may take glucose tablets. Follow all instructions so your surgery wont be cancelled. Please, be on time. If a situation occurs and you are delayed the day of surgery, call [phone] . If your physical condition changes (like a fever, cold, flu, etc.) call your surgeon. Home medication(s) reviewed and verified verbally and with list during PAT appointment. The patient was contacted in person. The patient verbalizes understanding of all instructions and does not need reinforcement.

## 2021-04-04 ENCOUNTER — HOSPITAL ENCOUNTER (OUTPATIENT)
Dept: PREADMISSION TESTING | Age: 36
Discharge: HOME | End: 2021-04-04
Payer: COMMERCIAL

## 2021-04-04 PROCEDURE — U0003 INFECTIOUS AGENT DETECTION BY NUCLEIC ACID (DNA OR RNA); SEVERE ACUTE RESPIRATORY SYNDROME CORONAVIRUS 2 (SARS-COV-2) (CORONAVIRUS DISEASE [COVID-19]), AMPLIFIED PROBE TECHNIQUE, MAKING USE OF HIGH THROUGHPUT TECHNOLOGIES AS DESCRIBED BY CMS-2020-01-R: HCPCS

## 2021-04-05 LAB — SARS-COV-2, COV2NT: NOT DETECTED

## 2021-04-07 ENCOUNTER — ANESTHESIA EVENT (OUTPATIENT)
Dept: SURGERY | Age: 36
End: 2021-04-07
Payer: COMMERCIAL

## 2021-04-07 NOTE — PERIOP NOTES
Spoke to [facility] at Dr. Russo Pouch office requesting the H&P for surgery. The H&P has been received, but has not been signed by the surgeon. Left a note on the front of the chart requesting that the note be signed.   DOS: 4/8/2021

## 2021-04-07 NOTE — PERIOP NOTES
Spoke to Ric at Dr. Loreta Granda office requesting that the H&P for surgery be faxed to [facility].   DOS: 4/8/2021

## 2021-04-08 ENCOUNTER — ANESTHESIA (OUTPATIENT)
Dept: SURGERY | Age: 36
End: 2021-04-08
Payer: COMMERCIAL

## 2021-04-08 ENCOUNTER — HOSPITAL ENCOUNTER (OUTPATIENT)
Age: 36
Setting detail: OUTPATIENT SURGERY
Discharge: HOME | End: 2021-04-08
Attending: SURGERY | Admitting: SURGERY
Payer: COMMERCIAL

## 2021-04-08 VITALS
RESPIRATION RATE: 27 BRPM | HEIGHT: 64 IN | BODY MASS INDEX: 35.42 KG/M2 | OXYGEN SATURATION: 99 % | TEMPERATURE: 99.3 F | DIASTOLIC BLOOD PRESSURE: 79 MMHG | HEART RATE: 90 BPM | WEIGHT: 207.45 LBS | SYSTOLIC BLOOD PRESSURE: 115 MMHG

## 2021-04-08 DIAGNOSIS — K80.20 CALCULUS OF GALLBLADDER WITHOUT CHOLECYSTITIS WITHOUT OBSTRUCTION: Primary | ICD-10-CM

## 2021-04-08 LAB
GLUCOSE BLD STRIP.AUTO-MCNC: 155 MG/DL (ref 65–100)
GLUCOSE BLD STRIP.AUTO-MCNC: 233 MG/DL (ref 65–100)
HCG UR QL: NEGATIVE
SERVICE CMNT-IMP: ABNORMAL
SERVICE CMNT-IMP: ABNORMAL

## 2021-04-08 PROCEDURE — 74011000254 HC RX REV CODE- 254: Performed by: SURGERY

## 2021-04-08 PROCEDURE — 76210000016 HC OR PH I REC 1 TO 1.5 HR: Performed by: SURGERY

## 2021-04-08 PROCEDURE — 74011000258 HC RX REV CODE- 258: Performed by: SURGERY

## 2021-04-08 PROCEDURE — 77030020703 HC SEAL CANN DISP INTU -B: Performed by: SURGERY

## 2021-04-08 PROCEDURE — 74011000250 HC RX REV CODE- 250: Performed by: SURGERY

## 2021-04-08 PROCEDURE — 77030010935 HC CLP LIG ABSRB TELE -B: Performed by: SURGERY

## 2021-04-08 PROCEDURE — 77030026438 HC STYL ET INTUB CARD -A: Performed by: ANESTHESIOLOGY

## 2021-04-08 PROCEDURE — 77030019908 HC STETH ESOPH SIMS -A: Performed by: ANESTHESIOLOGY

## 2021-04-08 PROCEDURE — 77030008771 HC TU NG SALEM SUMP -A: Performed by: ANESTHESIOLOGY

## 2021-04-08 PROCEDURE — 74011250636 HC RX REV CODE- 250/636: Performed by: ANESTHESIOLOGY

## 2021-04-08 PROCEDURE — 77030040922 HC BLNKT HYPOTHRM STRY -A

## 2021-04-08 PROCEDURE — 77030035277 HC OBTRTR BLDELSS DISP INTU -B: Performed by: SURGERY

## 2021-04-08 PROCEDURE — C9290 INJ, BUPIVACAINE LIPOSOME: HCPCS | Performed by: SURGERY

## 2021-04-08 PROCEDURE — 77030035029 HC NDL INSUF VERES DISP COVD -B: Performed by: SURGERY

## 2021-04-08 PROCEDURE — 2709999900 HC NON-CHARGEABLE SUPPLY: Performed by: SURGERY

## 2021-04-08 PROCEDURE — 82962 GLUCOSE BLOOD TEST: CPT

## 2021-04-08 PROCEDURE — 74011250636 HC RX REV CODE- 250/636: Performed by: SURGERY

## 2021-04-08 PROCEDURE — 77030008684 HC TU ET CUF COVD -B: Performed by: ANESTHESIOLOGY

## 2021-04-08 PROCEDURE — 77030018836 HC SOL IRR NACL ICUM -A: Performed by: SURGERY

## 2021-04-08 PROCEDURE — 77030040926 HC LAP BG TISS RETVR CNMD -B: Performed by: SURGERY

## 2021-04-08 PROCEDURE — 74011250636 HC RX REV CODE- 250/636: Performed by: NURSE ANESTHETIST, CERTIFIED REGISTERED

## 2021-04-08 PROCEDURE — 77030040361 HC SLV COMPR DVT MDII -B

## 2021-04-08 PROCEDURE — 88304 TISSUE EXAM BY PATHOLOGIST: CPT

## 2021-04-08 PROCEDURE — 76060000035 HC ANESTHESIA 2 TO 2.5 HR: Performed by: SURGERY

## 2021-04-08 PROCEDURE — 77030013079 HC BLNKT BAIR HGGR 3M -A: Performed by: ANESTHESIOLOGY

## 2021-04-08 PROCEDURE — 77030010507 HC ADH SKN DERMBND J&J -B: Performed by: SURGERY

## 2021-04-08 PROCEDURE — 77030031139 HC SUT VCRL2 J&J -A: Performed by: SURGERY

## 2021-04-08 PROCEDURE — 77030016151 HC PROTCTR LNS DFOG COVD -B: Performed by: SURGERY

## 2021-04-08 PROCEDURE — 74011000250 HC RX REV CODE- 250: Performed by: NURSE ANESTHETIST, CERTIFIED REGISTERED

## 2021-04-08 PROCEDURE — 76010000876 HC OR TIME 2 TO 2.5HR INTENSV - TIER 2: Performed by: SURGERY

## 2021-04-08 PROCEDURE — 81025 URINE PREGNANCY TEST: CPT

## 2021-04-08 PROCEDURE — 76210000020 HC REC RM PH II FIRST 0.5 HR: Performed by: SURGERY

## 2021-04-08 RX ORDER — MIDAZOLAM HYDROCHLORIDE 1 MG/ML
INJECTION, SOLUTION INTRAMUSCULAR; INTRAVENOUS AS NEEDED
Status: DISCONTINUED | OUTPATIENT
Start: 2021-04-08 | End: 2021-04-08 | Stop reason: HOSPADM

## 2021-04-08 RX ORDER — OXYCODONE HYDROCHLORIDE 5 MG/1
5 TABLET ORAL
Qty: 20 TAB | Refills: 0 | Status: SHIPPED | OUTPATIENT
Start: 2021-04-08 | End: 2021-04-11

## 2021-04-08 RX ORDER — NEOSTIGMINE METHYLSULFATE 1 MG/ML
INJECTION, SOLUTION INTRAVENOUS AS NEEDED
Status: DISCONTINUED | OUTPATIENT
Start: 2021-04-08 | End: 2021-04-08 | Stop reason: HOSPADM

## 2021-04-08 RX ORDER — SODIUM CHLORIDE 0.9 % (FLUSH) 0.9 %
5-40 SYRINGE (ML) INJECTION EVERY 8 HOURS
Status: DISCONTINUED | OUTPATIENT
Start: 2021-04-08 | End: 2021-04-08 | Stop reason: HOSPADM

## 2021-04-08 RX ORDER — HYDROMORPHONE HYDROCHLORIDE 1 MG/ML
.25-1 INJECTION, SOLUTION INTRAMUSCULAR; INTRAVENOUS; SUBCUTANEOUS
Status: DISCONTINUED | OUTPATIENT
Start: 2021-04-08 | End: 2021-04-08 | Stop reason: HOSPADM

## 2021-04-08 RX ORDER — LIDOCAINE HYDROCHLORIDE 10 MG/ML
0.1 INJECTION, SOLUTION EPIDURAL; INFILTRATION; INTRACAUDAL; PERINEURAL AS NEEDED
Status: DISCONTINUED | OUTPATIENT
Start: 2021-04-08 | End: 2021-04-08 | Stop reason: HOSPADM

## 2021-04-08 RX ORDER — FENTANYL CITRATE 50 UG/ML
INJECTION, SOLUTION INTRAMUSCULAR; INTRAVENOUS AS NEEDED
Status: DISCONTINUED | OUTPATIENT
Start: 2021-04-08 | End: 2021-04-08 | Stop reason: HOSPADM

## 2021-04-08 RX ORDER — ONDANSETRON 2 MG/ML
INJECTION INTRAMUSCULAR; INTRAVENOUS AS NEEDED
Status: DISCONTINUED | OUTPATIENT
Start: 2021-04-08 | End: 2021-04-08 | Stop reason: HOSPADM

## 2021-04-08 RX ORDER — BUPIVACAINE HYDROCHLORIDE 5 MG/ML
INJECTION, SOLUTION EPIDURAL; INTRACAUDAL AS NEEDED
Status: DISCONTINUED | OUTPATIENT
Start: 2021-04-08 | End: 2021-04-08 | Stop reason: HOSPADM

## 2021-04-08 RX ORDER — SODIUM CHLORIDE 0.9 % (FLUSH) 0.9 %
5-40 SYRINGE (ML) INJECTION AS NEEDED
Status: DISCONTINUED | OUTPATIENT
Start: 2021-04-08 | End: 2021-04-08 | Stop reason: HOSPADM

## 2021-04-08 RX ORDER — GLYCOPYRROLATE 0.2 MG/ML
INJECTION INTRAMUSCULAR; INTRAVENOUS AS NEEDED
Status: DISCONTINUED | OUTPATIENT
Start: 2021-04-08 | End: 2021-04-08 | Stop reason: HOSPADM

## 2021-04-08 RX ORDER — DEXAMETHASONE SODIUM PHOSPHATE 4 MG/ML
INJECTION, SOLUTION INTRA-ARTICULAR; INTRALESIONAL; INTRAMUSCULAR; INTRAVENOUS; SOFT TISSUE AS NEEDED
Status: DISCONTINUED | OUTPATIENT
Start: 2021-04-08 | End: 2021-04-08 | Stop reason: HOSPADM

## 2021-04-08 RX ORDER — FAMOTIDINE 10 MG/ML
INJECTION INTRAVENOUS AS NEEDED
Status: DISCONTINUED | OUTPATIENT
Start: 2021-04-08 | End: 2021-04-08 | Stop reason: HOSPADM

## 2021-04-08 RX ORDER — INDOCYANINE GREEN AND WATER 25 MG
7.5 KIT INJECTION
Status: COMPLETED | OUTPATIENT
Start: 2021-04-08 | End: 2021-04-08

## 2021-04-08 RX ORDER — DIPHENHYDRAMINE HYDROCHLORIDE 50 MG/ML
12.5 INJECTION, SOLUTION INTRAMUSCULAR; INTRAVENOUS AS NEEDED
Status: DISCONTINUED | OUTPATIENT
Start: 2021-04-08 | End: 2021-04-08 | Stop reason: HOSPADM

## 2021-04-08 RX ORDER — PROPOFOL 10 MG/ML
INJECTION, EMULSION INTRAVENOUS AS NEEDED
Status: DISCONTINUED | OUTPATIENT
Start: 2021-04-08 | End: 2021-04-08 | Stop reason: HOSPADM

## 2021-04-08 RX ORDER — SODIUM CHLORIDE, SODIUM LACTATE, POTASSIUM CHLORIDE, CALCIUM CHLORIDE 600; 310; 30; 20 MG/100ML; MG/100ML; MG/100ML; MG/100ML
75 INJECTION, SOLUTION INTRAVENOUS CONTINUOUS
Status: DISCONTINUED | OUTPATIENT
Start: 2021-04-08 | End: 2021-04-08 | Stop reason: HOSPADM

## 2021-04-08 RX ORDER — LIDOCAINE HYDROCHLORIDE 20 MG/ML
INJECTION, SOLUTION EPIDURAL; INFILTRATION; INTRACAUDAL; PERINEURAL AS NEEDED
Status: DISCONTINUED | OUTPATIENT
Start: 2021-04-08 | End: 2021-04-08 | Stop reason: HOSPADM

## 2021-04-08 RX ORDER — ONDANSETRON 2 MG/ML
4 INJECTION INTRAMUSCULAR; INTRAVENOUS AS NEEDED
Status: DISCONTINUED | OUTPATIENT
Start: 2021-04-08 | End: 2021-04-08 | Stop reason: HOSPADM

## 2021-04-08 RX ORDER — SODIUM CHLORIDE, SODIUM LACTATE, POTASSIUM CHLORIDE, CALCIUM CHLORIDE 600; 310; 30; 20 MG/100ML; MG/100ML; MG/100ML; MG/100ML
100 INJECTION, SOLUTION INTRAVENOUS CONTINUOUS
Status: DISCONTINUED | OUTPATIENT
Start: 2021-04-08 | End: 2021-04-08 | Stop reason: HOSPADM

## 2021-04-08 RX ORDER — KETOROLAC TROMETHAMINE 30 MG/ML
INJECTION, SOLUTION INTRAMUSCULAR; INTRAVENOUS AS NEEDED
Status: DISCONTINUED | OUTPATIENT
Start: 2021-04-08 | End: 2021-04-08 | Stop reason: HOSPADM

## 2021-04-08 RX ORDER — ROCURONIUM BROMIDE 10 MG/ML
INJECTION, SOLUTION INTRAVENOUS AS NEEDED
Status: DISCONTINUED | OUTPATIENT
Start: 2021-04-08 | End: 2021-04-08 | Stop reason: HOSPADM

## 2021-04-08 RX ORDER — SODIUM CHLORIDE, SODIUM LACTATE, POTASSIUM CHLORIDE, CALCIUM CHLORIDE 600; 310; 30; 20 MG/100ML; MG/100ML; MG/100ML; MG/100ML
125 INJECTION, SOLUTION INTRAVENOUS CONTINUOUS
Status: DISCONTINUED | OUTPATIENT
Start: 2021-04-08 | End: 2021-04-08 | Stop reason: HOSPADM

## 2021-04-08 RX ADMIN — PROPOFOL 150 MG: 10 INJECTION, EMULSION INTRAVENOUS at 13:35

## 2021-04-08 RX ADMIN — HYDROMORPHONE HYDROCHLORIDE 0.5 MG: 1 INJECTION, SOLUTION INTRAMUSCULAR; INTRAVENOUS; SUBCUTANEOUS at 16:34

## 2021-04-08 RX ADMIN — CEFAZOLIN SODIUM 2 G: 1 POWDER, FOR SOLUTION INTRAMUSCULAR; INTRAVENOUS at 13:42

## 2021-04-08 RX ADMIN — MIDAZOLAM HYDROCHLORIDE 3 MG: 2 INJECTION, SOLUTION INTRAMUSCULAR; INTRAVENOUS at 13:27

## 2021-04-08 RX ADMIN — ONDANSETRON HYDROCHLORIDE 4 MG: 2 SOLUTION INTRAMUSCULAR; INTRAVENOUS at 15:21

## 2021-04-08 RX ADMIN — FENTANYL CITRATE 100 MCG: 50 INJECTION, SOLUTION INTRAMUSCULAR; INTRAVENOUS at 13:33

## 2021-04-08 RX ADMIN — GLYCOPYRROLATE 0.4 MG: 0.2 INJECTION INTRAMUSCULAR; INTRAVENOUS at 15:29

## 2021-04-08 RX ADMIN — LIDOCAINE HYDROCHLORIDE 40 MG: 20 INJECTION, SOLUTION EPIDURAL; INFILTRATION; INTRACAUDAL; PERINEURAL at 13:35

## 2021-04-08 RX ADMIN — FAMOTIDINE 20 MG: 10 INJECTION INTRAVENOUS at 13:45

## 2021-04-08 RX ADMIN — INDOCYANINE GREEN AND WATER 7.5 MG: KIT at 11:38

## 2021-04-08 RX ADMIN — FENTANYL CITRATE 50 MCG: 50 INJECTION, SOLUTION INTRAMUSCULAR; INTRAVENOUS at 14:53

## 2021-04-08 RX ADMIN — FENTANYL CITRATE 100 MCG: 50 INJECTION, SOLUTION INTRAMUSCULAR; INTRAVENOUS at 14:07

## 2021-04-08 RX ADMIN — ONDANSETRON 4 MG: 2 INJECTION INTRAMUSCULAR; INTRAVENOUS at 16:28

## 2021-04-08 RX ADMIN — Medication 3 MG: at 15:29

## 2021-04-08 RX ADMIN — SODIUM CHLORIDE, POTASSIUM CHLORIDE, SODIUM LACTATE AND CALCIUM CHLORIDE 75 ML/HR: 600; 310; 30; 20 INJECTION, SOLUTION INTRAVENOUS at 11:38

## 2021-04-08 RX ADMIN — ROCURONIUM BROMIDE 40 MG: 10 INJECTION INTRAVENOUS at 13:36

## 2021-04-08 RX ADMIN — DEXAMETHASONE SODIUM PHOSPHATE 4 MG: 4 INJECTION, SOLUTION INTRAMUSCULAR; INTRAVENOUS at 13:50

## 2021-04-08 RX ADMIN — KETOROLAC TROMETHAMINE 30 MG: 30 INJECTION INTRAMUSCULAR; INTRAVENOUS at 15:19

## 2021-04-08 NOTE — BRIEF OP NOTE
Brief Postoperative Note    Patient: Mayur Gipson  YOB: 1985  MRN: [id number]    Date of Procedure: 4/8/2021     Pre-Op Diagnosis: GALLSTONES    Post-Op Diagnosis: Same as preoperative diagnosis.       Procedure(s):  ROBOTIC ASSISTED MULTIPORT CHOLECYSTECTOMY WITH INDOCYANNINE GREEN    Surgeon(s):  Opal Walker MD    Surgical Assistant: Surg Asst-1: Tsering Russo    Anesthesia: General     Estimated Blood Loss (mL): Minimal    Complications: None    Specimens:   ID Type Source Tests Collected by Time Destination   1 : gallbladder Preservative Gallbladder  Opal Walker MD 4/8/2021 1414 Pathology        Implants:none  Drains:   Orogastric Tube 04/08/21 (Active)       Findings: large gallstone    Electronically Signed by Alka Duran MD on 4/8/2021 at 3:24 PM

## 2021-04-08 NOTE — ANESTHESIA PREPROCEDURE EVALUATION
Relevant Problems   No relevant active problems       Anesthetic History               Review of Systems / Medical History  Patient summary reviewed and nursing notes reviewed    Pulmonary            Asthma : well controlled       Neuro/Psych   Within defined limits           Cardiovascular    Hypertension: well controlled          Hyperlipidemia    Exercise tolerance: >4 METS     GI/Hepatic/Renal               Comments: Biliary colic x 2 months Endo/Other    Diabetes: type 2, using insulin    Obesity     Other Findings              Physical Exam    Airway  Mallampati: II    Neck ROM: normal range of motion   Mouth opening: Normal     Cardiovascular    Rhythm: regular  Rate: normal         Dental  No notable dental hx       Pulmonary  Breath sounds clear to auscultation               Abdominal         Other Findings            Anesthetic Plan    ASA: 3  Anesthesia type: general          Induction: Intravenous  Anesthetic plan and risks discussed with: Patient      Informed consent obtained.

## 2021-04-08 NOTE — DISCHARGE INSTRUCTIONS
POST-OPERATIVE INSTRUCTIONS  OUTPATIENT SURGERY Robotic CHOLECYSTECTOMY    Today you underwent a robotic cholecystectomy which is usually well tolerated. However, below is a list of instructions which are important for you to follow. If you have any questions, please call your surgeons office. 1 EATING: Please eat lightly when you go home today for the first 24 hours. You may resume your regular diet after that. 2. EXERCISE: Limit your exercise for the first week, although stairs or other walking is fine. No strenuous activity (No lifting >10-15lbs) for one month. 3. DRESSING: You may shower in 1 day, unless otherwise instructed by your surgeon. No pools, baths, or hot tubs for 2 weeks. 4. NO LIFTING: No lifting >10lbs for 4 weeks from day of surgery    5. DRIVING: No driving while taking prescription pain medicine, and until post-operative discomfort resolves. Usually you may begin driving within 1-2 weeks. 6. PAIN: A prescription for pain has been given to you or your family. Please use it as prescribed and if this is inadequate, please call your surgeons office. In some cases, Tylenol or Advil may be adequate; and in that case, you will not need to fill the prescription. Please call for any refills during office hours. Pain medication may cause constipation - Colace or Milk of Magnesia may be used as needed. May take Tylenol 1000mg every 6 hours as needed for pain  May take Ibuprofen 600-800mg every 6 hours as needed for pain      7. WOUND: If you notice any increased redness, swelling, pain or fever, please call the office. If this is noticed at a time after normal office hours, please call our answering service. 8. APPOINTMENT: Your surgeon would like to see you in his/her office in 14 days. If this appointment has not been made for you prior to your departure from Outpatient Surgery, please call your surgeons office to schedule your appointment.  If you have any questions or concerns, please do not hesitate to call your surgeon or any other staff member who will be able to assist you. [address] [facility]  [place] [facility]   [address] [place] [address], [facility] Fausto Swartz  [phone]      DISCHARGE SUMMARY from your Nurse      PATIENT INSTRUCTIONS    After general anesthesia or intravenous sedation, for 24 hours or while taking prescription Narcotics:  · Limit your activities  · Do not drive and operate hazardous machinery  · Do not make important personal or business decisions  · Do  not drink alcoholic beverages  · If you have not urinated within 8 hours after discharge, please contact your surgeon on call. Report the following to your surgeon:  · Excessive pain, swelling, redness or odor of or around the surgical area  · Temperature over 100.5  · Nausea and vomiting lasting longer than 4 hours or if unable to take medications  · Any signs of decreased circulation or nerve impairment to extremity: change in color, persistent  numbness, tingling, coldness or increase pain  · Any questions      GOOD HELP TO FIGHT AN INFECTION  Here are a few tip to help reduce the chance of getting an infection after surgery:   Wash Your Hands   Good handwashing is the most important thing you and your caregiver can do.  Wash before and after caring for any wounds. Dry your hand with a clean towel.  Wash with soap and water for at least 20 seconds. A TIP: sing the \"Happy Birthday\" song through one time while washing to help with the timing.  Use a hand  in between washings.  Shower   When your surgeon says it is OK to take a shower, use a new bar of antibacterial soap (if that is what you use, and keep that bar of soap ONLY for your use), or antibacterial body wash.  Use a clean wash cloth or sponge when you bathe.  Dry off with a clean towel  after every bath - be careful around any wounds, skin staples, sutures or surgical glue over/on wounds.    Do not enter swimming pools, hot tubs, lakes, rivers and/or ocean until wounds are healed and your doctor/surgeon says it is OK.  Use Clean Sheets   Sleep on freshly laundered sheets after your surgery.  Keep the surgery site covered with a clean, dry bandage (if instructed to do so). If the bandage becomes soiled, reapply a new, dry, clean bandage.  Do not allow pets to sleep with you while your wound is healing.  Lifestyle Modification and Controlling Your Blood Sugar   Smoking slows wound healing. Stop smoking and limit exposure to second-hand smoke.  High blood sugar slows wound healing. Eat a well-balanced diet to provide proper nutrition while healing   Monitor your blood sugar (if you are a diabetic) and take your medications as you are suppose to so you can control you blood sugar after surgery. COUGH AND DEEP BREATHE    Breathing deeply and coughing are very important exercises to do after surgery. Deep breathing and coughing open the little air tubes and air sacks in your lungs. You take deep breaths every day. You may not even notice - it is just something you do when you sigh or yawn. It is a natural exercise you do to keep these air passages open. After surgery, take deep breaths and cough, on purpose. DIRECTIONS:  · Take 10 to 15 slow deep breaths every hour while awake. · Breathe in deeply, and hold it for 2 seconds. · Exhale slowly through puckered lips, like blowing up a balloon. · After every 4th or 5th deep breath, hug your pillow to your chest or belly and give a hard, deep cough. Yes, it will probably hurt. But doing this exercise is a very important part of healing after surgery. Take your pain medicine to help you do this exercise without too much pain. Coughing and deep breathing help prevent bronchitis and pneumonia after surgery. If you had chest or belly surgery, use a pillow as a \"hug joanne\" and hold it tightly to your chest or belly when you cough.        ANKLE PUMPS    Ankle pumps increase the circulation of oxygenated blood to your lower extremities and decrease your risk for circulation problems such as blood clots. They also stretch the muscles, tendons and ligaments in your foot and ankle, and prevent joint contracture in the ankle and foot, especially after surgeries on the legs. It is important to do ankle pump exercises regularly after surgery because immobility increases your risk for developing a blood clot. Your doctor may also have you take an Aspirin for the next few days as well. If your doctor did not ask you to take an Aspirin, consult with him before starting Aspirin therapy on your own. The exercise is quite simple. · Slowly point your foot forward, feeling the muscles on the top of your lower leg stretch, and hold this position for 5 seconds. · Next, pull your foot back toward you as far as possible, stretching the calf muscles, and hold that position for 5 seconds. · Repeat with the other foot. · Perform 10 repetitions every hour while awake for both ankles if possible (down and then up with the foot once is one repetition). You should feel gentle stretching of the muscles in your lower leg when doing this exercise. If you feel pain, or your range of motion is limited, don't push too hard. Only go the limit your joint and muscles will let you go. If you have increasing pain, progressively worsening leg warmth or swelling, STOP the exercise and call your doctor. MEDICATION AND   SIDE EFFECT GUIDE    The [address] [place] [address] MEDICATION AND SIDE EFFECT GUIDE was provided to the PATIENT AND CARE PROVIDER. Information provided includes instruction about drug purpose and common side effects for the following medications:   · ***        These are general instructions for a healthy lifestyle:    *   Please give a list of your current medications to your Primary Care Provider.   *   Please update this list whenever your medications are discontinued, doses are changed, or new medications (including over-the-counter products) are added. *   Please carry medication information at all times in case of emergency situations. About Smoking  No smoking / No tobacco products  Avoid exposure to second hand smoke     Surgeon General's Warning:  Quitting smoking now greatly reduces serious risk to your health. Obesity, smoking, and sedentary lifestyle greatly increases your risk for illness and disease. A healthy diet, regular physical exercise & weight monitoring are important for maintaining a healthy lifestyle. Congestive Heart Failure  You may be retaining fluid if you have a history of heart failure or if you experience any of the following symptoms:  Weight gain of 3 pounds or more overnight or 5 pounds in a week, increased swelling in your hands or feet or shortness of breath while lying flat in bed. Please call your doctor as soon as you notice any of these symptoms; do not wait until your next office visit. Recognize signs and symptoms of STROKE:  F -  Face looks uneven  A -  Arms unable to move or move evenly  S -  Speech slurred or non-existent  T -  Time-call 911 as soon as signs and symptoms begin-DO NOT go          back to bed or wait to see if you get better-TIME IS BRAIN. Warning Signs of HEART ATTACK   Call 911 if you have these symptoms:     Chest discomfort. Most heart attacks involve discomfort in the center of the chest that lasts more than a few minutes, or that goes away and comes back. It can feel like uncomfortable pressure, squeezing, fullness, or pain.  Discomfort in other areas of the upper body. Symptoms can include pain or discomfort in one or both arms, the back, neck, jaw, or stomach.  Shortness of breath with or without chest discomfort.  Other signs may include breaking out in a cold sweat, nausea, or lightheadedness.     Don't wait more than five minutes to call 911 - MINUTES MATTER! Fast action can save your life. Calling 911 is almost always the fastest way to get lifesaving treatment. Emergency Medical Services staff can begin treatment when they arrive -- up to an hour sooner than if someone gets to the hospital by car. Learning About Coronavirus [phone])  Coronavirus [phone]): Overview  What is coronavirus (COVID-19)? The coronavirus disease (COVID-19) is caused by a virus. It is an illness that was first found in [place], [place], in December 2019. It has since spread worldwide. The virus can cause fever, cough, and trouble breathing. In severe cases, it can cause pneumonia and make it hard to breathe without help. It can cause death. Coronaviruses are a large group of viruses. They cause the common cold. They also cause more serious illnesses like Middle East respiratory syndrome (MERS) and severe acute respiratory syndrome (SARS). COVID-19 is caused by a novel coronavirus. That means it's a new type that has not been seen in people before. This virus spreads person-to-person through droplets from coughing and sneezing. It can also spread when you are close to someone who is infected. And it can spread when you touch something that has the virus on it, such as a doorknob or a tabletop. What can you do to protect yourself from coronavirus (COVID-19)? The best way to protect yourself from getting sick is to:  · Avoid areas where there is an outbreak. · Avoid contact with people who may be infected. · Wash your hands often with soap or alcohol-based hand sanitizers. · Avoid crowds and try to stay at least 6 feet away from other people. · Wash your hands often, especially after you cough or sneeze. Use soap and water, and scrub for at least 20 seconds. If soap and water aren't available, use an alcohol-based hand . · Avoid touching your mouth, nose, and eyes. What can you do to avoid spreading the virus to others?   To help avoid spreading the virus to others:  · Cover your mouth with a tissue when you cough or sneeze. Then throw the tissue in the trash. · Use a disinfectant to clean things that you touch often. · Stay home if you are sick or have been exposed to the virus. Don't go to school, work, or public areas. And don't use public transportation. · If you are sick:  ? Leave your home only if you need to get medical care. But call the doctor's office first so they know you're coming. And wear a face mask, if you have one.  ? If you have a face mask, wear it whenever you're around other people. It can help stop the spread of the virus when you cough or sneeze. ? Clean and disinfect your home every day. Use household  and disinfectant wipes or sprays. Take special care to clean things that you grab with your hands. These include doorknobs, remote controls, phones, and handles on your refrigerator and microwave. And don't forget countertops, tabletops, bathrooms, and computer keyboards. When to call for help  Call 911 anytime you think you may need emergency care. For example, call if:  · You have severe trouble breathing. (You can't talk at all.)  · You have constant chest pain or pressure. · You are severely dizzy or lightheaded. · You are confused or can't think clearly. · Your face and lips have a blue color. · You pass out (lose consciousness) or are very hard to wake up. Call your doctor now if you develop symptoms such as:  · Shortness of breath. · Fever. · Cough. If you need to get care, call ahead to the doctor's office for instructions before you go. Make sure you wear a face mask, if you have one, to prevent exposing other people to the virus. Where can you get the latest information? The following health organizations are tracking and studying this virus. Their websites contain the most up-to-date information. [place] also learn what to do if you think you may have been exposed to the virus. · [place]  Centers for Disease Control and Prevention (CDC): The CDC provides updated news about the disease and travel advice. The website also tells you how to prevent the spread of infection. www.cdc.gov  · World Health Organization [facility]): WHO offers information about the virus outbreaks. WHO also has travel advice. www.who.int  Current as of: April 1, 2020               Content Version: 12.4  © 2006-2020 [employer]orporated. Care instructions adapted under license by your healthcare professional. If you have questions about a medical condition or this instruction, always ask your healthcare professional. [address] any warranty or liability for your use of this information. The discharge information has been reviewed with the {PATIENT PARENT GUARDIAN:91055}. Any questions and concerns from the {PATIENT PARENT GUARDIAN:29196} have been addressed. The {PATIENT PARENT GUARDIAN:91490} verbalized understanding. CONTENTS FOUND IN YOUR DISCHARGE ENVELOPE:  [x]     Surgeon and Hospital Discharge Instructions  [x]     [facility] Surgical Services Care Provider Card  [x]     Medication & Side Effect Guide            (your newly prescribed medications have been marked/highlighted showing the most common side effects from   the classes of drugs on your prescriptions)  [x]     Medication Prescription(s) x oxycodone ( [x] These have been sent electronically to your pharmacy by your surgeon,   - OR -       your surgeon has already provided these to you during a previous/pre-op office visit)  [x]     [facility] Education Information  []     Physical Therapy Prescription  []     Follow-up Appointment Cards  []     Surgery-related Pictures/Media  []     Pain block and/or block with On-Q Catheter from Anesthesia Service (information included in your instructions above)  []     Medical device information sheets/pamphlets from their    []     School/work excuse note. []     /parent work excuse note.       The following personal items collected during your admission are returned to you:   Dental Appliance: Dental Appliances: None  Vision: Visual Aid: None  Hearing Aid:    Jewelry: Jewelry: None  Clothing: Clothing: Dress, Footwear(street clothes)  Other Valuables: Other Valuables: None  Valuables sent to safe:   DISCHARGE SUMMARY from your Nurse      PATIENT INSTRUCTIONS    After general anesthesia or intravenous sedation, for 24 hours or while taking prescription Narcotics:  · Limit your activities  · Do not drive and operate hazardous machinery  · Do not make important personal or business decisions  · Do  not drink alcoholic beverages  · If you have not urinated within 8 hours after discharge, please contact your surgeon on call. Report the following to your surgeon:  · Excessive pain, swelling, redness or odor of or around the surgical area  · Temperature over 100.5  · Nausea and vomiting lasting longer than 4 hours or if unable to take medications  · Any signs of decreased circulation or nerve impairment to extremity: change in color, persistent  numbness, tingling, coldness or increase pain  · Any questions      GOOD HELP TO FIGHT AN INFECTION  Here are a few tip to help reduce the chance of getting an infection after surgery:   Wash Your Hands   Good handwashing is the most important thing you and your caregiver can do.  Wash before and after caring for any wounds. Dry your hand with a clean towel.  Wash with soap and water for at least 20 seconds. A TIP: sing the \"Happy Birthday\" song through one time while washing to help with the timing.  Use a hand  in between washings.  Shower   When your surgeon says it is OK to take a shower, use a new bar of antibacterial soap (if that is what you use, and keep that bar of soap ONLY for your use), or antibacterial body wash.  Use a clean wash cloth or sponge when you bathe.    Dry off with a clean towel  after every bath - be careful around any wounds, skin staples, sutures or surgical glue over/on wounds.  Do not enter swimming pools, hot tubs, lakes, rivers and/or ocean until wounds are healed and your doctor/surgeon says it is OK.  Use Clean Sheets   Sleep on freshly laundered sheets after your surgery.  Keep the surgery site covered with a clean, dry bandage (if instructed to do so). If the bandage becomes soiled, reapply a new, dry, clean bandage.  Do not allow pets to sleep with you while your wound is healing.  Lifestyle Modification and Controlling Your Blood Sugar   Smoking slows wound healing. Stop smoking and limit exposure to second-hand smoke.  High blood sugar slows wound healing. Eat a well-balanced diet to provide proper nutrition while healing   Monitor your blood sugar (if you are a diabetic) and take your medications as you are suppose to so you can control you blood sugar after surgery. COUGH AND DEEP BREATHE    Breathing deeply and coughing are very important exercises to do after surgery. Deep breathing and coughing open the little air tubes and air sacks in your lungs. You take deep breaths every day. You may not even notice - it is just something you do when you sigh or yawn. It is a natural exercise you do to keep these air passages open. After surgery, take deep breaths and cough, on purpose. DIRECTIONS:  · Take 10 to 15 slow deep breaths every hour while awake. · Breathe in deeply, and hold it for 2 seconds. · Exhale slowly through puckered lips, like blowing up a balloon. · After every 4th or 5th deep breath, hug your pillow to your chest or belly and give a hard, deep cough. Yes, it will probably hurt. But doing this exercise is a very important part of healing after surgery. Take your pain medicine to help you do this exercise without too much pain. Coughing and deep breathing help prevent bronchitis and pneumonia after surgery.   If you had chest or belly surgery, use a pillow as a \"hug joanne\" and hold it tightly to your chest or belly when you cough. ANKLE PUMPS    Ankle pumps increase the circulation of oxygenated blood to your lower extremities and decrease your risk for circulation problems such as blood clots. They also stretch the muscles, tendons and ligaments in your foot and ankle, and prevent joint contracture in the ankle and foot, especially after surgeries on the legs. It is important to do ankle pump exercises regularly after surgery because immobility increases your risk for developing a blood clot. Your doctor may also have you take an Aspirin for the next few days as well. If your doctor did not ask you to take an Aspirin, consult with him before starting Aspirin therapy on your own. The exercise is quite simple. · Slowly point your foot forward, feeling the muscles on the top of your lower leg stretch, and hold this position for 5 seconds. · Next, pull your foot back toward you as far as possible, stretching the calf muscles, and hold that position for 5 seconds. · Repeat with the other foot. · Perform 10 repetitions every hour while awake for both ankles if possible (down and then up with the foot once is one repetition). You should feel gentle stretching of the muscles in your lower leg when doing this exercise. If you feel pain, or your range of motion is limited, don't push too hard. Only go the limit your joint and muscles will let you go. If you have increasing pain, progressively worsening leg warmth or swelling, STOP the exercise and call your doctor. MEDICATION AND   SIDE EFFECT GUIDE    The [employer] MEDICATION AND SIDE EFFECT GUIDE was provided to the PATIENT AND CARE PROVIDER.   Information provided includes instruction about drug purpose and common side effects for the following medications:   · ***        These are general instructions for a healthy lifestyle:    *   Please give a list of your current medications to your Primary Care Provider. *   Please update this list whenever your medications are discontinued, doses are changed, or new medications (including over-the-counter products) are added. *   Please carry medication information at all times in case of emergency situations. About Smoking  No smoking / No tobacco products  Avoid exposure to second hand smoke     Surgeon General's Warning:  Quitting smoking now greatly reduces serious risk to your health. Obesity, smoking, and sedentary lifestyle greatly increases your risk for illness and disease. A healthy diet, regular physical exercise & weight monitoring are important for maintaining a healthy lifestyle. Congestive Heart Failure  You may be retaining fluid if you have a history of heart failure or if you experience any of the following symptoms:  Weight gain of 3 pounds or more overnight or 5 pounds in a week, increased swelling in your hands or feet or shortness of breath while lying flat in bed. Please call your doctor as soon as you notice any of these symptoms; do not wait until your next office visit. Recognize signs and symptoms of STROKE:  F -  Face looks uneven  A -  Arms unable to move or move evenly  S -  Speech slurred or non-existent  T -  Time-call 911 as soon as signs and symptoms begin-DO NOT go          back to bed or wait to see if you get better-TIME IS BRAIN. Warning Signs of HEART ATTACK   Call 911 if you have these symptoms:     Chest discomfort. Most heart attacks involve discomfort in the center of the chest that lasts more than a few minutes, or that goes away and comes back. It can feel like uncomfortable pressure, squeezing, fullness, or pain.  Discomfort in other areas of the upper body. Symptoms can include pain or discomfort in one or both arms, the back, neck, jaw, or stomach.  Shortness of breath with or without chest discomfort.    Other signs may include breaking out in a cold sweat, nausea, or lightheadedness. Don't wait more than five minutes to call 911 - MINUTES MATTER! Fast action can save your life. Calling 911 is almost always the fastest way to get lifesaving treatment. Emergency Medical Services staff can begin treatment when they arrive -- up to an hour sooner than if someone gets to the hospital by car. Learning About Coronavirus [phone])  Coronavirus [phone]): Overview  What is coronavirus (COVID-19)? The coronavirus disease (COVID-19) is caused by a virus. It is an illness that was first found in [place], [place], in December 2019. It has since spread worldwide. The virus can cause fever, cough, and trouble breathing. In severe cases, it can cause pneumonia and make it hard to breathe without help. It can cause death. Coronaviruses are a large group of viruses. They cause the common cold. They also cause more serious illnesses like Middle East respiratory syndrome (MERS) and severe acute respiratory syndrome (SARS). COVID-19 is caused by a novel coronavirus. That means it's a new type that has not been seen in people before. This virus spreads person-to-person through droplets from coughing and sneezing. It can also spread when you are close to someone who is infected. And it can spread when you touch something that has the virus on it, such as a doorknob or a tabletop. What can you do to protect yourself from coronavirus (COVID-19)? The best way to protect yourself from getting sick is to:  · Avoid areas where there is an outbreak. · Avoid contact with people who may be infected. · Wash your hands often with soap or alcohol-based hand sanitizers. · Avoid crowds and try to stay at least 6 feet away from other people. · Wash your hands often, especially after you cough or sneeze. Use soap and water, and scrub for at least 20 seconds. If soap and water aren't available, use an alcohol-based hand .   · Avoid touching your mouth, nose, and eyes.  What can you do to avoid spreading the virus to others? To help avoid spreading the virus to others:  · Cover your mouth with a tissue when you cough or sneeze. Then throw the tissue in the trash. · Use a disinfectant to clean things that you touch often. · Stay home if you are sick or have been exposed to the virus. Don't go to school, work, or public areas. And don't use public transportation. · If you are sick:  ? Leave your home only if you need to get medical care. But call the doctor's office first so they know you're coming. And wear a face mask, if you have one.  ? If you have a face mask, wear it whenever you're around other people. It can help stop the spread of the virus when you cough or sneeze. ? Clean and disinfect your home every day. Use household  and disinfectant wipes or sprays. Take special care to clean things that you grab with your hands. These include doorknobs, remote controls, phones, and handles on your refrigerator and microwave. And don't forget countertops, tabletops, bathrooms, and computer keyboards. When to call for help  Call 911 anytime you think you may need emergency care. For example, call if:  · You have severe trouble breathing. (You can't talk at all.)  · You have constant chest pain or pressure. · You are severely dizzy or lightheaded. · You are confused or can't think clearly. · Your face and lips have a blue color. · You pass out (lose consciousness) or are very hard to wake up. Call your doctor now if you develop symptoms such as:  · Shortness of breath. · Fever. · Cough. If you need to get care, call ahead to the doctor's office for instructions before you go. Make sure you wear a face mask, if you have one, to prevent exposing other people to the virus. Where can you get the latest information? The following health organizations are tracking and studying this virus. Their websites contain the most up-to-date information.  Choco Campos also learn what to do if you think you may have been exposed to the virus. · U.S. Centers for Disease Control and Prevention (CDC): The CDC provides updated news about the disease and travel advice. The website also tells you how to prevent the spread of infection. www.cdc.gov  · World Health Organization [facility]): WHO offers information about the virus outbreaks. WHO also has travel advice. www.who.int  Current as of: April 1, 2020               Content Version: 12.4  © 2006-2020 [employer]. Care instructions adapted under license by your healthcare professional. If you have questions about a medical condition or this instruction, always ask your healthcare professional. [address] any warranty or liability for your use of this information. The discharge information has been reviewed with the {PATIENT PARENT GUARDIAN:07152}. Any questions and concerns from the {PATIENT PARENT GUARDIAN:09288} have been addressed. The {PATIENT PARENT GUARDIAN:33459} verbalized understanding.         CONTENTS FOUND IN YOUR DISCHARGE ENVELOPE:  [x]     Surgeon and Hospital Discharge Instructions  [x]     [facility] Surgical Services Care Provider Card  []     Medication & Side Effect Guide            (your newly prescribed medications have been marked/highlighted showing the most common side effects from   the classes of drugs on your prescriptions)  []     Medication Prescription(s) x *** ( [] These have been sent electronically to your pharmacy by your surgeon,   - OR -       your surgeon has already provided these to you during a previous/pre-op office visit)  []     [address]  []     Physical Therapy Prescription  []     Follow-up Appointment Cards  []     Surgery-related Pictures/Media  []     Pain block and/or block with On-Q Catheter from Anesthesia Service (information included in your instructions above)  []     Medical device information sheets/pamphlets from their  []     School/work excuse note. []     /parent work excuse note. The following personal items collected during your admission are returned to you:   Dental Appliance: Dental Appliances: None  Vision: Visual Aid: None  Hearing Aid:    Jewelry: Jewelry: None  Clothing: Clothing: Dress, Footwear(street clothes)  Other Valuables: Other Valuables: None  Valuables sent to safe:   DISCHARGE SUMMARY from your Nurse      PATIENT INSTRUCTIONS    After general anesthesia or intravenous sedation, for 24 hours or while taking prescription Narcotics:  · Limit your activities  · Do not drive and operate hazardous machinery  · Do not make important personal or business decisions  · Do  not drink alcoholic beverages  · If you have not urinated within 8 hours after discharge, please contact your surgeon on call. Report the following to your surgeon:  · Excessive pain, swelling, redness or odor of or around the surgical area  · Temperature over 100.5  · Nausea and vomiting lasting longer than 4 hours or if unable to take medications  · Any signs of decreased circulation or nerve impairment to extremity: change in color, persistent  numbness, tingling, coldness or increase pain  · Any questions      GOOD HELP TO FIGHT AN INFECTION  Here are a few tip to help reduce the chance of getting an infection after surgery:   Wash Your Hands   Good handwashing is the most important thing you and your caregiver can do.  Wash before and after caring for any wounds. Dry your hand with a clean towel.  Wash with soap and water for at least 20 seconds. A TIP: sing the \"Happy Birthday\" song through one time while washing to help with the timing.  Use a hand  in between washings.  Shower   When your surgeon says it is OK to take a shower, use a new bar of antibacterial soap (if that is what you use, and keep that bar of soap ONLY for your use), or antibacterial body wash.    Use a clean wash cloth or sponge when you bathe.  Dry off with a clean towel  after every bath - be careful around any wounds, skin staples, sutures or surgical glue over/on wounds.  Do not enter swimming pools, hot tubs, lakes, rivers and/or ocean until wounds are healed and your doctor/surgeon says it is OK.  Use Clean Sheets   Sleep on freshly laundered sheets after your surgery.  Keep the surgery site covered with a clean, dry bandage (if instructed to do so). If the bandage becomes soiled, reapply a new, dry, clean bandage.  Do not allow pets to sleep with you while your wound is healing.  Lifestyle Modification and Controlling Your Blood Sugar   Smoking slows wound healing. Stop smoking and limit exposure to second-hand smoke.  High blood sugar slows wound healing. Eat a well-balanced diet to provide proper nutrition while healing   Monitor your blood sugar (if you are a diabetic) and take your medications as you are suppose to so you can control you blood sugar after surgery. COUGH AND DEEP BREATHE    Breathing deeply and coughing are very important exercises to do after surgery. Deep breathing and coughing open the little air tubes and air sacks in your lungs. You take deep breaths every day. You may not even notice - it is just something you do when you sigh or yawn. It is a natural exercise you do to keep these air passages open. After surgery, take deep breaths and cough, on purpose. DIRECTIONS:  · Take 10 to 15 slow deep breaths every hour while awake. · Breathe in deeply, and hold it for 2 seconds. · Exhale slowly through puckered lips, like blowing up a balloon. · After every 4th or 5th deep breath, hug your pillow to your chest or belly and give a hard, deep cough. Yes, it will probably hurt. But doing this exercise is a very important part of healing after surgery. Take your pain medicine to help you do this exercise without too much pain.     Coughing and deep breathing help prevent bronchitis and pneumonia after surgery. If you had chest or belly surgery, use a pillow as a \"hug joanne\" and hold it tightly to your chest or belly when you cough. ANKLE PUMPS    Ankle pumps increase the circulation of oxygenated blood to your lower extremities and decrease your risk for circulation problems such as blood clots. They also stretch the muscles, tendons and ligaments in your foot and ankle, and prevent joint contracture in the ankle and foot, especially after surgeries on the legs. It is important to do ankle pump exercises regularly after surgery because immobility increases your risk for developing a blood clot. Your doctor may also have you take an Aspirin for the next few days as well. If your doctor did not ask you to take an Aspirin, consult with him before starting Aspirin therapy on your own. The exercise is quite simple. · Slowly point your foot forward, feeling the muscles on the top of your lower leg stretch, and hold this position for 5 seconds. · Next, pull your foot back toward you as far as possible, stretching the calf muscles, and hold that position for 5 seconds. · Repeat with the other foot. · Perform 10 repetitions every hour while awake for both ankles if possible (down and then up with the foot once is one repetition). You should feel gentle stretching of the muscles in your lower leg when doing this exercise. If you feel pain, or your range of motion is limited, don't push too hard. Only go the limit your joint and muscles will let you go. If you have increasing pain, progressively worsening leg warmth or swelling, STOP the exercise and call your doctor. MEDICATION AND   SIDE EFFECT GUIDE    The [employer] MEDICATION AND SIDE EFFECT GUIDE was provided to the PATIENT AND CARE PROVIDER.   Information provided includes instruction about drug purpose and common side effects for the following medications: · ***        These are general instructions for a healthy lifestyle:    *   Please give a list of your current medications to your Primary Care Provider. *   Please update this list whenever your medications are discontinued, doses are changed, or new medications (including over-the-counter products) are added. *   Please carry medication information at all times in case of emergency situations. About Smoking  No smoking / No tobacco products  Avoid exposure to second hand smoke     Surgeon General's Warning:  Quitting smoking now greatly reduces serious risk to your health. Obesity, smoking, and sedentary lifestyle greatly increases your risk for illness and disease. A healthy diet, regular physical exercise & weight monitoring are important for maintaining a healthy lifestyle. Congestive Heart Failure  You may be retaining fluid if you have a history of heart failure or if you experience any of the following symptoms:  Weight gain of 3 pounds or more overnight or 5 pounds in a week, increased swelling in your hands or feet or shortness of breath while lying flat in bed. Please call your doctor as soon as you notice any of these symptoms; do not wait until your next office visit. Recognize signs and symptoms of STROKE:  F -  Face looks uneven  A -  Arms unable to move or move evenly  S -  Speech slurred or non-existent  T -  Time-call 911 as soon as signs and symptoms begin-DO NOT go          back to bed or wait to see if you get better-TIME IS BRAIN. Warning Signs of HEART ATTACK   Call 911 if you have these symptoms:     Chest discomfort. Most heart attacks involve discomfort in the center of the chest that lasts more than a few minutes, or that goes away and comes back. It can feel like uncomfortable pressure, squeezing, fullness, or pain.  Discomfort in other areas of the upper body.  Symptoms can include pain or discomfort in one or both arms, the back, neck, jaw, or stomach.  Shortness of breath with or without chest discomfort.  Other signs may include breaking out in a cold sweat, nausea, or lightheadedness. Don't wait more than five minutes to call 911 - MINUTES MATTER! Fast action can save your life. Calling 911 is almost always the fastest way to get lifesaving treatment. Emergency Medical Services staff can begin treatment when they arrive -- up to an hour sooner than if someone gets to the hospital by car. Learning About Coronavirus [phone])  Coronavirus [phone]): Overview  What is coronavirus (COVID-19)? The coronavirus disease (COVID-19) is caused by a virus. It is an illness that was first found in [place], [place], in December 2019. It has since spread worldwide. The virus can cause fever, cough, and trouble breathing. In severe cases, it can cause pneumonia and make it hard to breathe without help. It can cause death. Coronaviruses are a large group of viruses. They cause the common cold. They also cause more serious illnesses like [place] East respiratory syndrome (MERS) and severe acute respiratory syndrome (SARS). COVID-19 is caused by a novel coronavirus. That means it's a new type that has not been seen in people before. This virus spreads person-to-person through droplets from coughing and sneezing. It can also spread when you are close to someone who is infected. And it can spread when you touch something that has the virus on it, such as a doorknob or a tabletop. What can you do to protect yourself from coronavirus (COVID-19)? The best way to protect yourself from getting sick is to:  · Avoid areas where there is an outbreak. · Avoid contact with people who may be infected. · Wash your hands often with soap or alcohol-based hand sanitizers. · Avoid crowds and try to stay at least 6 feet away from other people. · Wash your hands often, especially after you cough or sneeze. Use soap and water, and scrub for at least 20 seconds.  If soap and water aren't available, use an alcohol-based hand . · Avoid touching your mouth, nose, and eyes. What can you do to avoid spreading the virus to others? To help avoid spreading the virus to others:  · Cover your mouth with a tissue when you cough or sneeze. Then throw the tissue in the trash. · Use a disinfectant to clean things that you touch often. · Stay home if you are sick or have been exposed to the virus. Don't go to school, work, or public areas. And don't use public transportation. · If you are sick:  ? Leave your home only if you need to get medical care. But call the doctor's office first so they know you're coming. And wear a face mask, if you have one.  ? If you have a face mask, wear it whenever you're around other people. It can help stop the spread of the virus when you cough or sneeze. ? Clean and disinfect your home every day. Use household  and disinfectant wipes or sprays. Take special care to clean things that you grab with your hands. These include doorknobs, remote controls, phones, and handles on your refrigerator and microwave. And don't forget countertops, tabletops, bathrooms, and computer keyboards. When to call for help  Call 911 anytime you think you may need emergency care. For example, call if:  · You have severe trouble breathing. (You can't talk at all.)  · You have constant chest pain or pressure. · You are severely dizzy or lightheaded. · You are confused or can't think clearly. · Your face and lips have a blue color. · You pass out (lose consciousness) or are very hard to wake up. Call your doctor now if you develop symptoms such as:  · Shortness of breath. · Fever. · Cough. If you need to get care, call ahead to the doctor's office for instructions before you go. Make sure you wear a face mask, if you have one, to prevent exposing other people to the virus. Where can you get the latest information?   The following health organizations are tracking and studying this virus. Their websites contain the most up-to-date information. Gio Fregoso also learn what to do if you think you may have been exposed to the virus. · U.S. Centers for Disease Control and Prevention (CDC): The CDC provides updated news about the disease and travel advice. The website also tells you how to prevent the spread of infection. www.cdc.gov  · World Health Organization [facility]): WHO offers information about the virus outbreaks. WHO also has travel advice. www.who.int  Current as of: April 1, 2020               Content Version: 12.4  © 3121-9396 Health[employer], Incorporated. Care instructions adapted under license by your healthcare professional. If you have questions about a medical condition or this instruction, always ask your healthcare professional. [address] any warranty or liability for your use of this information. The discharge information has been reviewed with the {PATIENT PARENT GUARDIAN:31315}. Any questions and concerns from the {PATIENT PARENT GUARDIAN:90675} have been addressed. The {PATIENT PARENT GUARDIAN:27369} verbalized understanding.         CONTENTS FOUND IN YOUR DISCHARGE ENVELOPE:  [x]     Surgeon and Hospital Discharge Instructions  [x]     [facility] Surgical Services Care Provider Card  [x]     Medication & Side Effect Guide            (your newly prescribed medications have been marked/highlighted showing the most common side effects from   the classes of drugs on your prescriptions)  [x]     Medication Prescription(s) x oxycodone ( [x] These have been sent electronically to your pharmacy by your surgeon,   - OR -       your surgeon has already provided these to you during a previous/pre-op office visit)  [x]     EXPAREL Education Information  []     Physical Therapy Prescription  []     Follow-up Appointment Cards  []     Surgery-related Pictures/Media  []     Pain block and/or block with On-Q Catheter from Anesthesia Service (information included in your instructions above)  []     Medical device information sheets/pamphlets from their    []     School/work excuse note. []     /parent work excuse note. The following personal items collected during your admission are returned to you:   Dental Appliance: Dental Appliances: None  Vision: Visual Aid: None  Hearing Aid:    Jewelry: Jewelry: None  Clothing: Clothing: Dress, Footwear(street clothes)  Other Valuables: Other Valuables: None  Valuables sent to safe:         Controlling Post-Operative Pain with EXPAREL®   (Bupivacaine Liposome Injectable Suspension)    Pain control after surgery is important for your recovery. Your surgeon may use different medications to help control your pain, and other techniques such as rest, ice, and elevation may also be helpful. YOUR SURGEON GAVE YOU EXPAREL     EXPAREL is a local analgesic (a kind of numbing medicine) to help control pain after surgery. This medicine was injected in/around your incision before the surgery was over. Sometimes, the anesthesia team will use EXPAREL (if you received a regional anesthesia technique) to help control pain after surgery. Some things you may want to know:     EXPAREL contains the medication bupivacaine. it numbs the tissues and nerves around the surgery site.  EXPAREL is specially designed to release the numbing medication slowly over time. In making the EXPAREL release slowly over time, it may help reduce the number of other medications like narcotics you may need.  In addition to [address], your doctor may provide you other pain medications to control you pain.  Each patient is different and may respond to pain and pain medications differently. Depending on how you respond to EXPAREL, you may require less additional pain medication during your recovery. YOUR RECOVERY    When your pain is under control, your body can focus better on healing.   This is not the time to test your pain tolerance, to tough it out or grin and bear it.   If you feel your pain is not well controlled speak with your surgeon to discuss your pain.  Follow your discharge instructions.  Eat a healthy diet and drink plenty of water. Surgery stresses your body; your body responds by needing more energy to heal.    IMPORTANT PATIENT INFORMATION    Products that contain bupivacaine, like EXPAREL, may cause a temporary loss of sensation or the ability to move the area where the medicine was injected - this is normal and often the desired outcome of the injection, to help with pain control. However, the numbness should not last forever. Follow your post op instructions carefully. Tell your doctor if you are pregnant, think you are pregnant, or are considering breastfeeding; certain obstetrical and gynecological procedures should not use EXPAREL. Celina Naeemneo has not been studied in patients younger than 18 years. Side effects can occur with any medication, and it is important not to ignore anything you may be experiencing. Some patients who received EXPAREL experienced:            Nausea              Vomiting             Constipation    Rarely, patients who receive bupivacaine (the active ingredient in EXPAREL) have experienced numbness and tingling in their mouth or lips, lightheadedness, anxiety, or abnormal heart rate. Speak with your doctor right away if you think you may be experiencing any of these sensations, or if you have other questions regarding the possible side effects. Tell your doctor if you have severe liver or kidney disease. Visit "[employer] or call 5-852-ZT-EXPAREL for more information.     DATE GIVEN 04/08/21     TIME GIVEN 325 pm    DOSE GIVEN 50 ml  Other formulations of bupivacaine should not be administered within 96 hours following administration of EXPAREL     Modified from patient education handout, [address] [facility], [facility] [place], [place] [address] form [id number]; 17 March 2021

## 2021-04-08 NOTE — OP NOTES
Patient: Fern Diaz MRN: [id number]  SSN: [id number]    YOB: 1985  Age: 38 y.o. Sex: female        OPERATIVE REPORT - LAPAROSCOPIC ASSISTED ROBOTIC                                                                  MULTIPORT CHOLECYSTECTOMY    PREOPERATIVE DIAGNOSIS: Symptomatic cholelithiasis. POSTOPERATIVE DIAGNOSIS: Cholelithiasis     OPERATIVE PROCEDURE:  Robotic assisted laparoscopic multiport cholecystectomy. SURGEON: Lowell Reed MD    ANESTHESIA: General.    ANESTHESIOLOGIST: General    COMPLICATIONS: None    ASSISTANT: None    ESTIMATED BLOOD LOSS: Minimal.    INDICATION: Documented in the history and physical.    FINDINGS: Large gallstone    PROCEDURE: Patient was brought in the room and placed supine on the operating table. After general anesthesia induction and placement of endotracheal tube, patient's  abdomen was prepped and draped in standard surgical fashion. The  laparoscopic camera and CO2 insufflation apparatus were affixed to the  drapes in the usual fashion. Through a supraumbilical incision, a Veress needle was introduced and its  intraperitoneal position was confirmed with low intra-abdominal initial pressures. CO2 insufflation was connected and pneumoperitoneum was created and maintained at 15 mmHg. An 8-mm robotic trocar was introduced and 8mm robotic camera  Was passed through and immediate area was inspected and it was confirmed that there was no  intraabdominal injury during introduction of pneumoperitoneum and the  trocar. Under direct vision, 2 8-mm robotic ports were positioned, one to the  Left upper quadrant, one at the right upper quadrant, and another 5mm port between the umbilical and right upper quadrant port. Patient was positioned in reverse Trendelenburg with a tilt to the  left. The fundus was grasped and lifted up towards the diaphragm. The  infundibulum was retracted laterally and inferiorly after releasing the  adhesions.     The junction of the cystic duct and gallbladder was clearly identified, and  an adequate length of the cystic duct was dissected out. Similarly, the cystic  artery was also dissected out and an adequate length was displayed. Critical view of Calot's triangle was obtained and the structures were clearly identified. After this was satisfactorily done, the cystic duct was doubly clipped on proximal side and single clip on distal side and divided. The cystic artery was also singly clipped on both sides and divided. The gallbladder was then gently dissected off from the liver bed  using electrocautery and achieving hemostasis. as the dissection proceeded  upwards towards the fundus. The gallbladder was thus removed from its bed. Using a 8mm Endobag through the left upper quadrant port, the gallbladder  was removed intact, put the opening had to be enlarged since the stone was so large    Liver bed was inspected. No bleeding or bile leak was noted. The left upper quadrant port fascia was closed with 0 Vicryl running stitch. . All incisions had skin approximated with subcuticular 4-0 Vicryl   with Dermabond to the skin. Marcaine  was infiltrated into each port site, and exparil    SPECIMEN: Gallbladder. COUNTS: Correct at the completion of surgery.     Black Clark MD

## 2021-04-08 NOTE — ANESTHESIA POSTPROCEDURE EVALUATION
Procedure(s):  ROBOTIC ASSISTED MULTIPORT CHOLECYSTECTOMY WITH INDOCYANNINE GREEN. general    Anesthesia Post Evaluation      Multimodal analgesia: multimodal analgesia used between 6 hours prior to anesthesia start to PACU discharge  Patient location during evaluation: bedside  Patient participation: complete - patient participated  Level of consciousness: awake  Pain management: adequate  Airway patency: patent  Anesthetic complications: no  Cardiovascular status: acceptable  Respiratory status: acceptable  Hydration status: acceptable        INITIAL Post-op Vital signs:   Vitals Value Taken Time   /75 04/08/21 1705   Temp 37.4 °C (99.3 °F) 04/08/21 1600   Pulse 87 04/08/21 1707   Resp 26 04/08/21 1707   SpO2 99 % 04/08/21 1707   Vitals shown include unvalidated device data.

## 2021-09-16 ENCOUNTER — HOSPITAL ENCOUNTER (EMERGENCY)
Age: 36
Discharge: HOME | End: 2021-09-17
Attending: EMERGENCY MEDICINE
Payer: COMMERCIAL

## 2021-09-16 ENCOUNTER — APPOINTMENT (OUTPATIENT)
Dept: GENERAL RADIOLOGY | Age: 36
End: 2021-09-16
Attending: EMERGENCY MEDICINE
Payer: COMMERCIAL

## 2021-09-16 VITALS
OXYGEN SATURATION: 97 % | DIASTOLIC BLOOD PRESSURE: 83 MMHG | WEIGHT: 208.56 LBS | SYSTOLIC BLOOD PRESSURE: 127 MMHG | HEART RATE: 102 BPM | HEIGHT: 64 IN | RESPIRATION RATE: 19 BRPM | TEMPERATURE: 97.5 F | BODY MASS INDEX: 35.61 KG/M2

## 2021-09-16 DIAGNOSIS — J20.9 ACUTE BRONCHITIS, UNSPECIFIED ORGANISM: Primary | ICD-10-CM

## 2021-09-16 LAB
BASOPHILS # BLD: 0 K/UL (ref 0–0.1)
BASOPHILS NFR BLD: 0 % (ref 0–1)
DIFFERENTIAL METHOD BLD: ABNORMAL
EOSINOPHIL # BLD: 0.1 K/UL (ref 0–0.4)
EOSINOPHIL NFR BLD: 1 % (ref 0–7)
ERYTHROCYTE [DISTWIDTH] IN BLOOD BY AUTOMATED COUNT: 13.2 % (ref 11.5–14.5)
HCT VFR BLD AUTO: 45.9 % (ref 35–47)
HGB BLD-MCNC: 15.2 G/DL (ref 11.5–16)
IMM GRANULOCYTES # BLD AUTO: 0 K/UL (ref 0–0.04)
IMM GRANULOCYTES NFR BLD AUTO: 0 % (ref 0–0.5)
LYMPHOCYTES # BLD: 3 K/UL (ref 0.8–3.5)
LYMPHOCYTES NFR BLD: 30 % (ref 12–49)
MCH RBC QN AUTO: 28.6 PG (ref 26–34)
MCHC RBC AUTO-ENTMCNC: 33.1 G/DL (ref 30–36.5)
MCV RBC AUTO: 86.3 FL (ref 80–99)
MONOCYTES # BLD: 0.5 K/UL (ref 0–1)
MONOCYTES NFR BLD: 5 % (ref 5–13)
NEUTS SEG # BLD: 6.6 K/UL (ref 1.8–8)
NEUTS SEG NFR BLD: 64 % (ref 32–75)
NRBC # BLD: 0 K/UL (ref 0–0.01)
NRBC BLD-RTO: 0 PER 100 WBC
PLATELET # BLD AUTO: 310 K/UL (ref 150–400)
PMV BLD AUTO: 10.9 FL (ref 8.9–12.9)
RBC # BLD AUTO: 5.32 M/UL (ref 3.8–5.2)
WBC # BLD AUTO: 10.2 K/UL (ref 3.6–11)

## 2021-09-16 PROCEDURE — 71046 X-RAY EXAM CHEST 2 VIEWS: CPT

## 2021-09-16 PROCEDURE — 84484 ASSAY OF TROPONIN QUANT: CPT

## 2021-09-16 PROCEDURE — 93005 ELECTROCARDIOGRAM TRACING: CPT

## 2021-09-16 PROCEDURE — 85025 COMPLETE CBC W/AUTO DIFF WBC: CPT

## 2021-09-16 PROCEDURE — 84703 CHORIONIC GONADOTROPIN ASSAY: CPT

## 2021-09-16 PROCEDURE — 83880 ASSAY OF NATRIURETIC PEPTIDE: CPT

## 2021-09-16 PROCEDURE — 99282 EMERGENCY DEPT VISIT SF MDM: CPT

## 2021-09-16 PROCEDURE — 80053 COMPREHEN METABOLIC PANEL: CPT

## 2021-09-16 PROCEDURE — 85379 FIBRIN DEGRADATION QUANT: CPT

## 2021-09-16 PROCEDURE — 36415 COLL VENOUS BLD VENIPUNCTURE: CPT

## 2021-09-16 NOTE — Clinical Note
[address] [place] [address] Dr  [facility] EMERGENCY DEPT  [address] [id number]  [phone]    Work/School Note    Date: 9/16/2021    To Whom It May concern:    Sammy Cancer was seen and treated today in the emergency room by the following provider(s):  Attending Provider: Yahir Brewster MD  Nurse Practitioner: Brendan Zayas NP. Sammy Cancer is excused from work/school on 09/17/21 and 09/18/21. She is medically clear to return to work/school on 9/19/2021.        Sincerely,          Mohit Garcia NP

## 2021-09-17 LAB
ALBUMIN SERPL-MCNC: 3.8 G/DL (ref 3.5–5)
ALBUMIN/GLOB SERPL: 1 {RATIO} (ref 1.1–2.2)
ALP SERPL-CCNC: 154 U/L (ref 45–117)
ALT SERPL-CCNC: 50 U/L (ref 12–78)
ANION GAP SERPL CALC-SCNC: 8 MMOL/L (ref 5–15)
AST SERPL-CCNC: 18 U/L (ref 15–37)
ATRIAL RATE: 101 BPM
BILIRUB SERPL-MCNC: 0.3 MG/DL (ref 0.2–1)
BNP SERPL-MCNC: 10 PG/ML
BUN SERPL-MCNC: 13 MG/DL (ref 6–20)
BUN/CREAT SERPL: 15 (ref 12–20)
CALCIUM SERPL-MCNC: 8.4 MG/DL (ref 8.5–10.1)
CALCULATED P AXIS, ECG09: 40 DEGREES
CALCULATED R AXIS, ECG10: 20 DEGREES
CALCULATED T AXIS, ECG11: 25 DEGREES
CHLORIDE SERPL-SCNC: 101 MMOL/L (ref 97–108)
CO2 SERPL-SCNC: 24 MMOL/L (ref 21–32)
CREAT SERPL-MCNC: 0.84 MG/DL (ref 0.55–1.02)
D DIMER PPP FEU-MCNC: 0.19 MG/L FEU (ref 0–0.65)
DIAGNOSIS, 93000: NORMAL
GLOBULIN SER CALC-MCNC: 3.8 G/DL (ref 2–4)
GLUCOSE SERPL-MCNC: 389 MG/DL (ref 65–100)
HCG SERPL QL: NEGATIVE
P-R INTERVAL, ECG05: 132 MS
POTASSIUM SERPL-SCNC: 3.7 MMOL/L (ref 3.5–5.1)
PROT SERPL-MCNC: 7.6 G/DL (ref 6.4–8.2)
Q-T INTERVAL, ECG07: 362 MS
QRS DURATION, ECG06: 72 MS
QTC CALCULATION (BEZET), ECG08: 469 MS
SARS-COV-2, COV2: NORMAL
SARS-COV-2, XPLCVT: NOT DETECTED
SODIUM SERPL-SCNC: 133 MMOL/L (ref 136–145)
SOURCE, COVRS: NORMAL
TROPONIN I SERPL-MCNC: <0.05 NG/ML
VENTRICULAR RATE, ECG03: 101 BPM

## 2021-09-17 PROCEDURE — U0005 INFEC AGEN DETEC AMPLI PROBE: HCPCS

## 2021-09-17 RX ORDER — BENZONATATE 100 MG/1
200 CAPSULE ORAL
Qty: 30 CAPSULE | Refills: 0 | Status: SHIPPED | OUTPATIENT
Start: 2021-09-17 | End: 2021-09-24

## 2021-09-17 RX ORDER — IPRATROPIUM BROMIDE AND ALBUTEROL SULFATE 2.5; .5 MG/3ML; MG/3ML
3 SOLUTION RESPIRATORY (INHALATION)
Qty: 30 NEBULE | Refills: 0 | Status: SHIPPED | OUTPATIENT
Start: 2021-09-17

## 2021-09-17 NOTE — ED PROVIDER NOTES
This is a 28-year-old female with past medical history of intermittent asthma, diabetes, GERD, and hypertension who presents the ER today for evaluation of dry cough and shortness of breath. Patient states that she felt like she had a viral illness last week that has since then improved (received a negative COVID-19 test). She states that starting earlier today she started to feel a sensation of vague chest discomfort, increased coughing, and shortness of breath. She took her Symbicort and albuterol inhaler with no significant improvement in her symptoms. Patient has been vaccinated for COVID-19. She denies any leg swelling, skin rash, loss of taste or smell, or fever. She denies any clear aggravating or alleviating factors to her symptoms since they started. Past Medical History:   Diagnosis Date    Asthma     Diabetes ([address]     GERD (gastroesophageal reflux disease)     Hypertension        Past Surgical History:   Procedure Laterality Date    ENDOMETRIAL CRYOABLATION N/A 11/2020    HX BILATERAL SALPINGECTOMY Bilateral 03/2018         No family history on file.     Social History     Socioeconomic History    Marital status:      Spouse name: Not on file    Number of children: Not on file    Years of education: Not on file    Highest education level: Not on file   Occupational History    Not on file   Tobacco Use    Smoking status: Never Smoker    Smokeless tobacco: Never Used   Substance and Sexual Activity    Alcohol use: Not Currently    Drug use: Never    Sexual activity: Yes     Partners: Male     Birth control/protection: Surgical   Other Topics Concern    Not on file   Social History Narrative    Not on file     Social Determinants of Health     Financial Resource Strain:     Difficulty of Paying Living Expenses:    Food Insecurity:     Worried About Running Out of Food in the Last Year:     [address] in the Last Year:    Transportation Needs:     Lack of Transportation (Medical):  Lack of Transportation (Non-Medical):    Physical Activity:     Days of Exercise per Week:     Minutes of Exercise per Session:    Stress:     Feeling of Stress :    Social Connections:     Frequency of Communication with Friends and Family:     Frequency of Social Gatherings with Friends and Family:     Attends Buddhist Services:     Active Member of Clubs or Organizations:     Attends Club or Organization Meetings:     Marital Status:    Intimate Partner Violence:     Fear of Current or Ex-Partner:     Emotionally Abused:     Physically Abused:     Sexually Abused: ALLERGIES: Patient has no known allergies. Review of Systems   Constitutional: Negative for fever. HENT: Negative for sore throat. Eyes: Negative for visual disturbance. Respiratory: Positive for cough, chest tightness, shortness of breath and wheezing. Cardiovascular: Negative for palpitations and leg swelling. Gastrointestinal: Negative for vomiting. Genitourinary: Negative for dysuria. Musculoskeletal: Negative for myalgias. Skin: Negative for rash. Neurological: Negative for dizziness. Psychiatric/Behavioral: Negative for dysphoric mood. Vitals:    09/16/21 2209   BP: 127/83   Pulse: (!) 102   Resp: 19   Temp: 97.5 °F (36.4 °C)   SpO2: 97%   Weight: 94.6 kg (208 lb 8.9 oz)   Height: 5' 4\" (1.626 m)            Physical Exam  Vitals and nursing note reviewed. Constitutional:       General: She is not in acute distress. Appearance: Normal appearance. She is not ill-appearing. HENT:      Head: Normocephalic and atraumatic. Right Ear: External ear normal.      Left Ear: External ear normal.      Nose: Nose normal.      Mouth/Throat:      Mouth: Mucous membranes are moist.      Pharynx: Oropharynx is clear. Eyes:      General: No scleral icterus. Extraocular Movements: Extraocular movements intact.       Conjunctiva/sclera: Conjunctivae normal. Pupils: Pupils are equal, round, and reactive to light. Cardiovascular:      Rate and Rhythm: Regular rhythm. Tachycardia present. Pulses: Normal pulses. Heart sounds: Normal heart sounds. No murmur heard. No friction rub. Pulmonary:      Effort: Pulmonary effort is normal. No respiratory distress. Breath sounds: Normal breath sounds. No wheezing, rhonchi or rales. Chest:      Chest wall: No tenderness. Abdominal:      General: Abdomen is flat. Bowel sounds are normal.      Palpations: Abdomen is soft. Musculoskeletal:         General: Normal range of motion. Cervical back: Normal range of motion and neck supple. No rigidity. No muscular tenderness. Skin:     General: Skin is warm and dry. Coloration: Skin is not pale. Neurological:      General: No focal deficit present. Mental Status: She is alert and oriented to person, place, and time. Psychiatric:         Mood and Affect: Mood normal.         Behavior: Behavior normal.         Thought Content: Thought content normal.         Judgment: Judgment normal.          MDM      VITAL SIGNS:  Patient Vitals for the past 4 hrs:   Temp Pulse Resp BP SpO2   09/16/21 2209 97.5 °F (36.4 °C) (!) 102 19 127/83 97 %         LABS:  Recent Results (from the past 6 hour(s))   CBC WITH AUTOMATED DIFF    Collection Time: 09/16/21 11:10 PM   Result Value Ref Range    WBC 10.2 3.6 - 11.0 K/uL    RBC 5.32 (H) 3.80 - 5.20 M/uL    HGB 15.2 11.5 - 16.0 g/dL    HCT 45.9 35.0 - 47.0 %    MCV 86.3 80.0 - 99.0 FL    MCH 28.6 26.0 - 34.0 PG    MCHC 33.1 30.0 - 36.5 g/dL    RDW 13.2 11.5 - 14.5 %    PLATELET 649 308 - 953 K/uL    MPV 10.9 8.9 - 12.9 FL    NRBC 0.0 0  WBC    ABSOLUTE NRBC 0.00 0.00 - 0.01 K/uL    NEUTROPHILS 64 32 - 75 %    LYMPHOCYTES 30 12 - 49 %    MONOCYTES 5 5 - 13 %    EOSINOPHILS 1 0 - 7 %    BASOPHILS 0 0 - 1 %    IMMATURE GRANULOCYTES 0 0.0 - 0.5 %    ABS. NEUTROPHILS 6.6 1.8 - 8.0 K/UL    ABS.  LYMPHOCYTES 3.0 0.8 - 3.5 K/UL    ABS. MONOCYTES 0.5 0.0 - 1.0 K/UL    ABS. EOSINOPHILS 0.1 0.0 - 0.4 K/UL    ABS. BASOPHILS 0.0 0.0 - 0.1 K/UL    ABS. IMM. GRANS. 0.0 0.00 - 0.04 K/UL    DF AUTOMATED     D DIMER    Collection Time: 09/16/21 11:10 PM   Result Value Ref Range    D-dimer 0.19 0.00 - 0.65 mg/L FEU   METABOLIC PANEL, COMPREHENSIVE    Collection Time: 09/16/21 11:10 PM   Result Value Ref Range    Sodium 133 (L) 136 - 145 mmol/L    Potassium 3.7 3.5 - 5.1 mmol/L    Chloride 101 97 - 108 mmol/L    CO2 24 21 - 32 mmol/L    Anion gap 8 5 - 15 mmol/L    Glucose 389 (H) 65 - 100 mg/dL    BUN 13 6 - 20 MG/DL    Creatinine 0.84 0.55 - 1.02 MG/DL    BUN/Creatinine ratio 15 12 - 20      GFR est AA >60 >60 ml/min/1.73m2    GFR est non-AA >60 >60 ml/min/1.73m2    Calcium 8.4 (L) 8.5 - 10.1 MG/DL    Bilirubin, total 0.3 0.2 - 1.0 MG/DL    ALT (SGPT) 50 12 - 78 U/L    AST (SGOT) 18 15 - 37 U/L    Alk.  phosphatase 154 (H) 45 - 117 U/L    Protein, total 7.6 6.4 - 8.2 g/dL    Albumin 3.8 3.5 - 5.0 g/dL    Globulin 3.8 2.0 - 4.0 g/dL    A-G Ratio 1.0 (L) 1.1 - 2.2     TROPONIN I    Collection Time: 09/16/21 11:10 PM   Result Value Ref Range    Troponin-I, Qt. <0.05 <0.05 ng/mL   NT-PRO BNP    Collection Time: 09/16/21 11:10 PM   Result Value Ref Range    NT pro-BNP 10 <125 PG/ML   HCG QL SERUM    Collection Time: 09/16/21 11:10 PM   Result Value Ref Range    HCG, Ql. Negative NEG     EKG, 12 LEAD, INITIAL    Collection Time: 09/16/21 11:19 PM   Result Value Ref Range    Ventricular Rate 101 BPM    Atrial Rate 101 BPM    P-R Interval 132 ms    QRS Duration 72 ms    Q-T Interval 362 ms    QTC Calculation (Bezet) 469 ms    Calculated P Axis 40 degrees    Calculated R Axis 20 degrees    Calculated T Axis 25 degrees    Diagnosis       Sinus tachycardia  Otherwise normal ECG  When compared with ECG of 02-APR-2021 08:49,  Nonspecific T wave abnormality has replaced inverted T waves in Inferior   leads  QT has lengthened          IMAGING:  XR CHEST PA LAT   Final Result   No acute process. Medications During Visit:  Medications - No data to display      DECISION MAKING:  Silver Mathis is a 38 y.o. female who comes in as above. Work-up negative for acute process. There was no wheezing present at initial evaluation or at time of reassessment for discharge. Suspect bronchitis is most likely etiology for the patient's symptoms. I discussed with holding corticosteroids due to hyperglycemia, which the patient verbalized understanding. She is to use Tessalon and her DuoNeb's for symptom control and to follow-up with her PCP or [facility]. The clinical decision making for this encounter included ordering and interpreting the above diagnostic tests with comparison to prior studies that are within our EMR. Past medical and surgical histories were reviewed, as were records from recent outpatient and emergency department visits. The above results discussed and reviewed with the patient. Patient verbalized understanding of the care plan, including any changes to current outpatient medication regimen, discussed disease process, symptom control, and follow-up care. Return precautions reviewed. IMPRESSION:  1. Acute bronchitis, unspecified organism        DISPOSITION:  Discharged      Current Discharge Medication List      START taking these medications    Details   !! albuterol-ipratropium (DUO-NEB) 2.5 mg-0.5 mg/3 ml nebu 3 mL by Nebulization route every four (4) hours as needed for Wheezing. Qty: 30 Nebule, Refills: 0  Start date: 9/17/2021      !! benzonatate (Tessalon Perles) 100 mg capsule Take 2 Capsules by mouth three (3) times daily as needed for Cough for up to 7 days. Qty: 30 Capsule, Refills: 0  Start date: 9/17/2021, End date: 9/24/2021      !! albuterol-ipratropium (DUO-NEB) 2.5 mg-0.5 mg/3 ml nebu 3 mL by Nebulization route every four (4) hours as needed for Wheezing.   Qty: 30 Nebule, Refills: 0  Start date: 9/17/2021      !! benzonatate (Tessalon Perles) 100 mg capsule Take 2 Capsules by mouth three (3) times daily as needed for Cough for up to 7 days. Qty: 30 Capsule, Refills: 0  Start date: 9/17/2021, End date: 9/24/2021       !! - Potential duplicate medications found. Please discuss with provider. Follow-up Information     Follow up With Specialties Details Why Contact Info    [facility]  Schedule an appointment as soon as possible for a visit   [facility]  [phone]            The patient is asked to follow-up with their primary care provider in the next several days. They are to call tomorrow for an appointment. The patient is asked to return promptly for any increased concerns or worsening of symptoms. They can return to this emergency department or any other emergency department.       Procedures

## 2021-09-17 NOTE — DISCHARGE INSTRUCTIONS
Your labs came back looking reassuring and your chest xray is clear. I don't recommend any prednisone because it could cause your blood sugars to elevate to unsafe levels. I recommend that you use your albuterol scheduled every 4-6 hours as needed for coughing or shortness of breath. You should start to take your Pulmicort/Symbicort inhaler once daily. This medication helps to prevent asthma flares. If needed, please follow up with [facility] for further testing.

## 2021-09-17 NOTE — ED TRIAGE NOTES
Patient reports Hx of Asthma with cough. Today developed SOB. On Sunday had Covid testing that was negative. Patient is vaccinated. At 8pm took her breathing treatment with no pain. Patient denies fever, chills. Patient is able to speak full sentences with O2 saturation of 98% on RA.

## 2022-12-24 ENCOUNTER — HOSPITAL ENCOUNTER (EMERGENCY)
Age: 37
Discharge: HOME | End: 2022-12-24
Attending: EMERGENCY MEDICINE
Payer: COMMERCIAL

## 2022-12-24 ENCOUNTER — APPOINTMENT (OUTPATIENT)
Dept: VASCULAR SURGERY | Age: 37
End: 2022-12-24
Attending: EMERGENCY MEDICINE
Payer: COMMERCIAL

## 2022-12-24 VITALS
TEMPERATURE: 97.4 F | WEIGHT: 210 LBS | HEIGHT: 64 IN | OXYGEN SATURATION: 96 % | SYSTOLIC BLOOD PRESSURE: 136 MMHG | HEART RATE: 79 BPM | BODY MASS INDEX: 35.85 KG/M2 | RESPIRATION RATE: 18 BRPM | DIASTOLIC BLOOD PRESSURE: 89 MMHG

## 2022-12-24 DIAGNOSIS — T14.8XXA MUSCLE STRAIN: Primary | ICD-10-CM

## 2022-12-24 PROCEDURE — 96372 THER/PROPH/DIAG INJ SC/IM: CPT

## 2022-12-24 PROCEDURE — 99284 EMERGENCY DEPT VISIT MOD MDM: CPT

## 2022-12-24 PROCEDURE — 93971 EXTREMITY STUDY: CPT

## 2022-12-24 PROCEDURE — 74011250636 HC RX REV CODE- 250/636: Performed by: EMERGENCY MEDICINE

## 2022-12-24 RX ORDER — IBUPROFEN 800 MG/1
800 TABLET ORAL
Qty: 20 TABLET | Refills: 0 | Status: SHIPPED | OUTPATIENT
Start: 2022-12-24 | End: 2022-12-31

## 2022-12-24 RX ORDER — KETOROLAC TROMETHAMINE 30 MG/ML
30 INJECTION, SOLUTION INTRAMUSCULAR; INTRAVENOUS ONCE
Status: COMPLETED | OUTPATIENT
Start: 2022-12-24 | End: 2022-12-24

## 2022-12-24 RX ADMIN — KETOROLAC TROMETHAMINE 30 MG: 30 INJECTION, SOLUTION INTRAMUSCULAR; INTRAVENOUS at 20:03

## 2022-12-24 NOTE — Clinical Note
[address]  [facility] EMERGENCY DEPT  [address] [id number] [phone]    Work/School Note    Date: 12/24/2022    To Whom It May concern:    Stephany Robertson was seen and treated today in the emergency room by the following provider(s):  No providers found. Stephany Robertson is excused from work/school on 12/25/2022 through 12/27/2022. She is medically clear to return to work/school on 12/28/2022.          Sincerely,          Amadou Hawkins MD

## 2022-12-25 NOTE — ED TRIAGE NOTES
Pt to triage in wheelchair w/ c/o left calf muscle pain. Pt states pulling muscle while walking dog this afternoon around 1730. Pt took oxycodone about one hour ago.

## 2022-12-25 NOTE — ED PROVIDER NOTES
The history is provided by the patient. Leg Injury   This is a new problem. The current episode started 3 to 5 hours ago. The problem occurs constantly. The pain is present in the left lower leg. The pain is severe. Associated symptoms include limited range of motion. The symptoms are aggravated by palpation, movement and standing. Treatments tried: oxycodone. The treatment provided no relief. There has been a history of trauma (thinks she had a mild muscle injury 2 days ago that was exacerbated by running her dog today). Past Medical History:  Diagnosis  Date    Asthma    Diabetes ([address]    GERD (gastroesophageal reflux disease)    Hypertension      Past Surgical History:  Procedure  Laterality  Date    ENDOMETRIAL CRYOABLATION  N/A  11/2020    HX BILATERAL SALPINGECTOMY  Bilateral  03/2018        No family history on file.     Social History    Socioeconomic History                                                                                                                                                                                                                                                                            Marital status:    Spouse name:  Not on file    Number of children:  Not on file    Years of education:  Not on file    Highest education level:  Not on file  Occupational History    Not on file  Tobacco Use    Smoking status:  Never    Smokeless tobacco:  Never  Substance and Sexual Activity    Alcohol use:  Not Currently    Drug use:  Never    Sexual activity:  Yes    Partners:  Male    Birth control/protection:  Surgical  Other Topics  Concern    Not on file  Social History Narrative    Not on file    Social Determinants of Health    Financial Resource Strain: Not on file  Food Insecurity: Not on file  Transportation Needs: Not on file  Physical Activity: Not on file  Stress: Not on file  Social Connections: Not on file  Intimate Partner Violence: Not on file  Housing Stability: Not on file        ALLERGIES: Patient has no known allergies. Review of Systems   Musculoskeletal:  Positive for gait problem and myalgias. All other systems reviewed and are negative. There were no vitals filed for this visit. Physical Exam  Vitals and nursing note reviewed. Constitutional:       Appearance: She is well-developed. She is not ill-appearing. HENT:      Head: Normocephalic and atraumatic. Eyes:      General: No scleral icterus. Cardiovascular:      Rate and Rhythm: Normal rate. Pulmonary:      Effort: Pulmonary effort is normal.   Abdominal:      General: There is no distension. Musculoskeletal:      Cervical back: Normal range of motion. Left lower leg: Swelling and tenderness present. No deformity or bony tenderness. No edema. Left ankle: Normal.      Left Achilles Tendon: Normal.   Skin:     Findings: No erythema or rash. Neurological:      General: No focal deficit present. Mental Status: She is alert and oriented to person, place, and time. Psychiatric:         Mood and Affect: Mood normal.         Behavior: Behavior normal.        MDM         Procedures        Pt presents with an extremity injury. No evidence of fracture, dislocation, or other significant musculoskeletal injury. No DVT or cellulitis and achilles tendon is in tact. Patient was discharged home with a plan for pain control as well as instructions on managing her injuries and precautions for returning to the emergency department. She will utilize RICE and not put weight on her injured leg. No evidence of compartment syndrome on evaluation. Patient will be discharged home to follow-up with orthopedic surgery as instructed in discharge paperwork. Patient and family expressed understanding and agreed with plan.

## 2023-01-08 ENCOUNTER — HOSPITAL ENCOUNTER (EMERGENCY)
Age: 38
Discharge: HOME | End: 2023-01-08
Attending: EMERGENCY MEDICINE
Payer: COMMERCIAL

## 2023-01-08 VITALS
HEART RATE: 98 BPM | TEMPERATURE: 99.2 F | SYSTOLIC BLOOD PRESSURE: 131 MMHG | HEIGHT: 64 IN | DIASTOLIC BLOOD PRESSURE: 83 MMHG | RESPIRATION RATE: 18 BRPM | WEIGHT: 209 LBS | BODY MASS INDEX: 35.68 KG/M2 | OXYGEN SATURATION: 96 %

## 2023-01-08 DIAGNOSIS — T14.8XXA MUSCLE STRAIN: Primary | ICD-10-CM

## 2023-01-08 DIAGNOSIS — S86.112A GASTROCNEMIUS MUSCLE TEAR, LEFT, INITIAL ENCOUNTER: ICD-10-CM

## 2023-01-08 PROCEDURE — 99283 EMERGENCY DEPT VISIT LOW MDM: CPT

## 2023-01-08 RX ORDER — NAPROXEN 500 MG/1
500 TABLET ORAL 2 TIMES DAILY WITH MEALS
Qty: 20 TABLET | Refills: 0 | Status: SHIPPED | OUTPATIENT
Start: 2023-01-08 | End: 2023-01-18

## 2023-01-08 NOTE — Clinical Note
[address]  [facility] EMERGENCY DEPT  [address] [id number]  [phone]    Work/School Note    Date: 1/8/2023    To Whom It May concern:    Pamela Toro was seen and treated today in the emergency room by the following provider(s):  Attending Provider: Klever Covington MD.      Pamela Toro is excused from work/school on 01/08/23 and 01/09/23. She is medically clear to return to work/school on 1/10/2023.        Sincerely,          Kaley Norman MD

## 2023-01-09 NOTE — ED TRIAGE NOTES
Pt presents to the ED with c/o bruising to the L ankle/foot. Pt states that she injured her calf a couple of weeks ago which is now healed, but noticed some bruising on the foot. Pt reports that she does not recall injuring her foot. Pt ambulatory to Triage. VAP (ventilator-associated pneumonia)

## 2023-01-09 NOTE — ED PROVIDER NOTES
18-year-old female with a past medical history significant for asthma, diabetes, GERD and hypertension, status post endometrial ablation and bilateral salpingectomy who presents to the ER with a complaint of swelling and bruising from her left ankle and foot that she noted over the past 2 days. The patient started approximately 2 weeks ago she injured her left calf there is significant swelling and pain. She could the emergency room and had a negative Doppler study. She stated the pain has improved however she is concerned about the swelling to her ankle and foot. She ambulates without difficulty. She denies any fever and chills, recent injury or fall, neck and back pain, chest pain shortness of breath, abdominal pain, dizziness, extremity weakness or numbness. Past Medical History:   Diagnosis Date    Asthma     Diabetes ([facility] [address]     GERD (gastroesophageal reflux disease)     Hypertension        Past Surgical History:   Procedure Laterality Date    ENDOMETRIAL CRYOABLATION N/A 11/2020    HX BILATERAL SALPINGECTOMY Bilateral 03/2018         No family history on file.     Social History     Socioeconomic History    Marital status:      Spouse name: Not on file    Number of children: Not on file    Years of education: Not on file    Highest education level: Not on file   Occupational History    Not on file   Tobacco Use    Smoking status: Never    Smokeless tobacco: Never   Substance and Sexual Activity    Alcohol use: Not Currently    Drug use: Never    Sexual activity: Yes     Partners: Male     Birth control/protection: Surgical   Other Topics Concern    Not on file   Social History Narrative    Not on file     Social Determinants of Health     Financial Resource Strain: Not on file   Food Insecurity: Not on file   Transportation Needs: Not on file   Physical Activity: Not on file   Stress: Not on file   Social Connections: Not on file   Intimate Partner Violence: Not on file   Housing Stability: Not on file         ALLERGIES: Patient has no known allergies. Review of Systems   All other systems reviewed and are negative. Vitals:    01/08/23 1907   BP: 131/83   Pulse: 98   Resp: 18   Temp: 99.2 °F (37.3 °C)   SpO2: 96%   Weight: 94.8 kg (209 lb)   Height: 5' 4\" (1.626 m)            Physical Exam  Vitals and nursing note reviewed. Exam conducted with a chaperone present. CONSTITUTIONAL: Well-appearing; well-nourished; in no apparent distress  HEAD: Normocephalic; atraumatic  EYES: PERRL; EOM intact; conjunctiva and sclera are clear bilaterally. ENT: No rhinorrhea; normal pharynx with no tonsillar hypertrophy; mucous membranes pink/moist, no erythema, no exudate. NECK: Supple; non-tender; no cervical lymphadenopathy  CARD: Normal S1, S2; no murmurs, rubs, or gallops. Regular rate and rhythm. RESP: Normal respiratory effort; breath sounds clear and equal bilaterally; no wheezes, rhonchi, or rales. ABD: Normal bowel sounds; non-distended; non-tender; no palpable organomegaly, no masses, no bruits. Back Exam: Normal inspection; no vertebral point tenderness, no CVA tenderness. Normal range of motion. EXT: Normal ROM in all four extremities; tenderness to palpation to the left calf especially on plantarflexion; no pain or tenderness in the left ankle and foot but mild swelling and ecchymosis; no deformity; distal pulses are normal, no edema. SKIN: Warm; dry; no rash. NEURO:Alert and oriented x 3, coherent, AUGUSTO-XII grossly intact, sensory and motor are non-focal.        Medical Decision Making  Assessment: 60-year-old female who presents to the ER with left calf injury sustained 2 weeks ago that was likely consistent with a muscle tear and depending edema with redness to the left foot and ankle area resulting in ecchymosis without any tenderness. She ambulated without difficulty but does have point tenderness in the soleus and gastroc muscles of the left leg.   She remained hemodynamically stable. Plan: Education, reassurance, symptomatic treatment/discharge with outpatient referral as needed/ Monitor and Reevaluate. Risk  Prescription drug management. Procedures      Progress Note:   Pt has been reexamined by Opal Tom MD. Pt is feeling much better. Symptoms have improved. All available results have been reviewed with pt and any available family. Pt understands sx, dx, and tx in ED. Care plan has been outlined and questions have been answered. Pt is ready to go home. Will send home on muscle strain and muscle tear of the left calf instruction. Prescription naproxen. . Outpatient referral with PCP/Ortho for evaluation and further treatment as needed. Written by Opal Tom MD,7:45 PM    .   .

## 2023-11-17 LAB — PAP SMEAR, EXTERNAL: NORMAL

## 2024-01-01 ASSESSMENT — ENCOUNTER SYMPTOMS
CHEST TIGHTNESS: 0
DIARRHEA: 0
COUGH: 0
BLOOD IN STOOL: 0
ABDOMINAL PAIN: 0
CONSTIPATION: 0
SHORTNESS OF BREATH: 0
SINUS PAIN: 0
NAUSEA: 0

## 2024-01-01 NOTE — PROGRESS NOTES
Assessment/Plan:     Diagnoses and all orders for this visit:    1. Uncontrolled type 2 diabetes mellitus with hyperglycemia, with long-term current use of insulin ([facility])  Reviewed A1c from gyn. Will switch trulicity to mounjaro and attempt to send higher concentration insulin. Reinforced carb control. Will follow closely and encouraged continued home checks. Will await sending novolog pending insurance auth for mounjaro.   - Lipid Panel; Future  - Microalbumin / Creatinine Urine Ratio; Future  - Hemoglobin A1C; Future  - CBC; Future  - Comprehensive Metabolic Panel; Future  - Tirzepatide (MOUNJARO) 2.5 MG/0.5ML SOPN SC injection; Inject 0.5 mLs into the skin once a week  Dispense: 2 mL; Refill: 1  - Insulin Glargine, 2 Unit Dial, (TOUJEO MAX SOLOSTAR) 300 UNIT/ML SOPN; Inject 70 Units into the skin at bedtime  Dispense: 15 mL; Refill: 1    2. Moderate persistent asthma without complication  Will send note for work through portal, denies needing refills    3. Class 2 severe obesity due to excess calories with serious comorbidity and body mass index (BMI) of 35.0 to 35.9 in adult ([facility])  The patient is asked to make an attempt to improve diet and exercise patterns to aid in medical management of this problem.          Return in about 4 weeks (around 1/30/2024) for Diabetes.       Discussed expected course/resolution/complications of diagnosis in detail with patient.    Medication risks/benefits/costs/interactions/alternatives discussed with patient.    Pt expressed understanding with the diagnosis and plan      Subjective:      CC  Sobia Hermosillo is a 38 y.o. female who presents for had concerns including 6 Month Follow-Up (Diabetes, Asthma).       HPI  Sobia Hermosillo is a 38 y.o. female who presents today as a new patient to [facility], however is known to this provider from prior internal medicine clinic. She was last seen by this provider March 2023.     She is known for IDDM T2 diagnosed > 10 yrs

## 2024-01-02 ENCOUNTER — OFFICE VISIT (OUTPATIENT)
Facility: CLINIC | Age: 39
End: 2024-01-02
Payer: MEDICAID

## 2024-01-02 VITALS
SYSTOLIC BLOOD PRESSURE: 120 MMHG | WEIGHT: 220 LBS | TEMPERATURE: 97.5 F | RESPIRATION RATE: 20 BRPM | DIASTOLIC BLOOD PRESSURE: 83 MMHG | HEIGHT: 64 IN | OXYGEN SATURATION: 98 % | BODY MASS INDEX: 37.56 KG/M2

## 2024-01-02 DIAGNOSIS — E11.65 UNCONTROLLED TYPE 2 DIABETES MELLITUS WITH HYPERGLYCEMIA, WITH LONG-TERM CURRENT USE OF INSULIN (HCC): Primary | ICD-10-CM

## 2024-01-02 DIAGNOSIS — Z79.4 UNCONTROLLED TYPE 2 DIABETES MELLITUS WITH HYPERGLYCEMIA, WITH LONG-TERM CURRENT USE OF INSULIN (HCC): Primary | ICD-10-CM

## 2024-01-02 DIAGNOSIS — J45.40 MODERATE PERSISTENT ASTHMA WITHOUT COMPLICATION: ICD-10-CM

## 2024-01-02 DIAGNOSIS — E66.01 CLASS 2 SEVERE OBESITY DUE TO EXCESS CALORIES WITH SERIOUS COMORBIDITY AND BODY MASS INDEX (BMI) OF 35.0 TO 35.9 IN ADULT (HCC): ICD-10-CM

## 2024-01-02 PROCEDURE — 99214 OFFICE O/P EST MOD 30 MIN: CPT | Performed by: FAMILY MEDICINE

## 2024-01-02 RX ORDER — TIRZEPATIDE 2.5 MG/.5ML
2.5 INJECTION, SOLUTION SUBCUTANEOUS WEEKLY
Qty: 2 ML | Refills: 1 | Status: SHIPPED | OUTPATIENT
Start: 2024-01-02

## 2024-01-02 RX ORDER — INSULIN GLARGINE 300 U/ML
70 INJECTION, SOLUTION SUBCUTANEOUS NIGHTLY
Qty: 15 ML | Refills: 1 | Status: SHIPPED | OUTPATIENT
Start: 2024-01-02

## 2024-01-02 RX ORDER — INSULIN ASPART 100 [IU]/ML
INJECTION, SUSPENSION SUBCUTANEOUS
COMMUNITY
Start: 2023-05-19 | End: 2024-01-02 | Stop reason: RX

## 2024-01-02 RX ORDER — DULAGLUTIDE 3 MG/.5ML
INJECTION, SOLUTION SUBCUTANEOUS
COMMUNITY
Start: 2023-03-21 | End: 2024-01-02 | Stop reason: CLARIF

## 2024-01-02 SDOH — ECONOMIC STABILITY: FOOD INSECURITY: WITHIN THE PAST 12 MONTHS, YOU WORRIED THAT YOUR FOOD WOULD RUN OUT BEFORE YOU GOT MONEY TO BUY MORE.: NEVER TRUE

## 2024-01-02 SDOH — ECONOMIC STABILITY: FOOD INSECURITY: WITHIN THE PAST 12 MONTHS, THE FOOD YOU BOUGHT JUST DIDN'T LAST AND YOU DIDN'T HAVE MONEY TO GET MORE.: NEVER TRUE

## 2024-01-02 SDOH — ECONOMIC STABILITY: HOUSING INSECURITY
IN THE LAST 12 MONTHS, WAS THERE A TIME WHEN YOU DID NOT HAVE A STEADY PLACE TO SLEEP OR SLEPT IN A SHELTER (INCLUDING NOW)?: NO

## 2024-01-02 SDOH — ECONOMIC STABILITY: INCOME INSECURITY: HOW HARD IS IT FOR YOU TO PAY FOR THE VERY BASICS LIKE FOOD, HOUSING, MEDICAL CARE, AND HEATING?: NOT HARD AT ALL

## 2024-01-02 ASSESSMENT — PATIENT HEALTH QUESTIONNAIRE - PHQ9
2. FEELING DOWN, DEPRESSED OR HOPELESS: 0
SUM OF ALL RESPONSES TO PHQ QUESTIONS 1-9: 0
1. LITTLE INTEREST OR PLEASURE IN DOING THINGS: 0
SUM OF ALL RESPONSES TO PHQ QUESTIONS 1-9: 0
SUM OF ALL RESPONSES TO PHQ QUESTIONS 1-9: 0
SUM OF ALL RESPONSES TO PHQ9 QUESTIONS 1 & 2: 0
SUM OF ALL RESPONSES TO PHQ QUESTIONS 1-9: 0

## 2024-01-02 NOTE — PATIENT INSTRUCTIONS
Diabetes:  Blood sugar goals:  Hemoglobin A1c under 7  Fasting blood sugar   Blood sugar 2 hours after a meal under 180, 4 hours after a meal under 120  No hypoglycemia (sugars under 70 and symptomatic low sugars)     Blood sugar control with diet and exercise:  Exercise 45 minutes per day.  This makes your insulin work better.  It also allows insulin levels to fall helping with weight loss.  Every night, try to fast from your evening meal to breakfast (at least 12 hours) without eating anything.  This uses stored energy in your liver and makes insulin work better.  Avoid simples sugars such as table sugar in drinks (sodas, lemonade, sweet tea, wine), desserts, candy.  Also avoid fruit juices and high fructose corn syrup.  Avoid frequent consumption of fruit especially grapes and bananas.  A single serving of fruit daily is all you should have.  Finally, drink less milk which has milk sugar in it.  Control your starch intake.  Men should have 3-4 carb portions per meal.  Women should have 2-3 carb portions per meal.  A carb serving is the equivalent of a slice of bread.     Control your blood pressure and cholesterol.  These problems are common in diabetes.  AND, don't smoke.  All of these problems contribute to heart disease and stroke risk.     Get a yearly eye exam and flu shot.    Make sure your vaccines are up to date particularly the pneumonia vaccines.     Inspect your feet daily.  Wear comfortable protective shoes and clean socks.  Seek medical care if there are sore, calluses or numbness and burning of your feet.

## 2024-01-02 NOTE — PROGRESS NOTES
dentified pt with two pt identifiers(name and ).    Chief Complaint   Patient presents with    6 Month Follow-Up     Diabetes, Asthma        Health Maintenance Due   Topic    Hepatitis B vaccine (1 of 3 - 3-dose series)    Varicella vaccine (1 of 2 - 2-dose childhood series)    Depression Screen     HIV screen     Hepatitis C screen     DTaP/Tdap/Td vaccine (1 - Tdap)    Cervical cancer screen     Diabetes screen     Pneumococcal 0-64 years Vaccine (2 - PCV)    Flu vaccine (1)    COVID-19 Vaccine ( -  season)       Wt Readings from Last 3 Encounters:   24 99.8 kg (220 lb)     Temp Readings from Last 3 Encounters:   24 97.5 °F (36.4 °C) (Temporal)     BP Readings from Last 3 Encounters:   24 120/83     Pulse Readings from Last 3 Encounters:   No data found for Pulse           Coordination of Care Questionnaire:  :   1. \"Have you been to the ER, urgent care clinic since your last visit?  Hospitalized since your last visit?\" no    2. \"Have you seen or consulted any other health care providers outside of the [facility] since your last visit?\" no     3. For patients aged 45-75: Has the patient had a colonoscopy / FIT/ Cologuard? no      If the patient is female:    4. For patients aged 40-74: Has the patient had a mammogram within the past 2 years? no      5. For patients aged 21-65: Has the patient had a pap smear? no     3) Do you have an Advance Directive on file? no  Are you interested in receiving information about Advance Directives? no    Patient is accompanied by self I have received verbal consent from Sobia Hermosillo to discuss any/all medical information while they are present in the room.

## 2024-01-05 ENCOUNTER — HOSPITAL ENCOUNTER (EMERGENCY)
Facility: HOSPITAL | Age: 39
Discharge: HOME | End: 2024-01-05
Attending: STUDENT IN AN ORGANIZED HEALTH CARE EDUCATION/TRAINING PROGRAM
Payer: MEDICAID

## 2024-01-05 ENCOUNTER — APPOINTMENT (OUTPATIENT)
Facility: HOSPITAL | Age: 39
End: 2024-01-05
Payer: MEDICAID

## 2024-01-05 VITALS
HEART RATE: 89 BPM | WEIGHT: 220 LBS | OXYGEN SATURATION: 96 % | BODY MASS INDEX: 37.56 KG/M2 | SYSTOLIC BLOOD PRESSURE: 150 MMHG | DIASTOLIC BLOOD PRESSURE: 97 MMHG | HEIGHT: 64 IN | TEMPERATURE: 98.2 F | RESPIRATION RATE: 16 BRPM

## 2024-01-05 DIAGNOSIS — M25.512 ACUTE PAIN OF LEFT SHOULDER: Primary | ICD-10-CM

## 2024-01-05 PROCEDURE — 6360000002 HC RX W HCPCS: Performed by: STUDENT IN AN ORGANIZED HEALTH CARE EDUCATION/TRAINING PROGRAM

## 2024-01-05 PROCEDURE — 73030 X-RAY EXAM OF SHOULDER: CPT

## 2024-01-05 PROCEDURE — 99284 EMERGENCY DEPT VISIT MOD MDM: CPT

## 2024-01-05 PROCEDURE — 96372 THER/PROPH/DIAG INJ SC/IM: CPT

## 2024-01-05 RX ORDER — KETOROLAC TROMETHAMINE 10 MG/1
10 TABLET, FILM COATED ORAL EVERY 6 HOURS PRN
Qty: 20 TABLET | Refills: 0 | Status: SHIPPED | OUTPATIENT
Start: 2024-01-05

## 2024-01-05 RX ORDER — KETOROLAC TROMETHAMINE 30 MG/ML
30 INJECTION, SOLUTION INTRAMUSCULAR; INTRAVENOUS
Status: COMPLETED | OUTPATIENT
Start: 2024-01-05 | End: 2024-01-05

## 2024-01-05 RX ADMIN — KETOROLAC TROMETHAMINE 30 MG: 30 INJECTION INTRAMUSCULAR; INTRAVENOUS at 20:31

## 2024-01-05 ASSESSMENT — PAIN DESCRIPTION - DESCRIPTORS
DESCRIPTORS: ACHING
DESCRIPTORS: ACHING;CRAMPING

## 2024-01-05 ASSESSMENT — PAIN DESCRIPTION - ORIENTATION
ORIENTATION: LEFT
ORIENTATION: LEFT

## 2024-01-05 ASSESSMENT — PAIN SCALES - GENERAL
PAINLEVEL_OUTOF10: 8
PAINLEVEL_OUTOF10: 8

## 2024-01-05 ASSESSMENT — PAIN - FUNCTIONAL ASSESSMENT: PAIN_FUNCTIONAL_ASSESSMENT: 0-10

## 2024-01-05 ASSESSMENT — PAIN DESCRIPTION - LOCATION
LOCATION: ARM
LOCATION: SHOULDER

## 2024-01-06 NOTE — DISCHARGE INSTRUCTIONS
- Continue Toradol for pain and follow up with orthopedics to be reevaluated  - Return for fevers, severe pain, weakness, swelling, worsening symptoms

## 2024-01-06 NOTE — ED TRIAGE NOTES
Pt reports dx last year of frozen L shoulder and increased pain. Last cortisone shot September 2023 and has doctor appointment next week. Hx DM

## 2024-01-06 NOTE — ED PROVIDER NOTES
[facility] EMERGENCY DEPT  EMERGENCY DEPARTMENT ENCOUNTER      Patient Name:  Sobia Hermosillo  MRN:   [id number]  :   1985  Date of Evaluation: 2024  Physician:  Nguyen Lenz MD    Chief Complaint   Patient presents with    Shoulder Pain       HISTORY OF PRESENT ILLNESS    This is a 38-year-old female with a history of asthma, insulin-dependent diabetes, hypertension, frozen shoulder diagnosed earlier this year, presenting with recurrent left-sided shoulder pain which started approximately 2 weeks ago when she returned home from [place].  She received cortisone injections in the shoulder earlier this summer and then more recently in September with some transient relief, had been attending physical therapy but could not receive physical therapy while she was on vacation which she feels like may have led to the recurrent symptoms.  She denies fevers, chest pain, shortness of breath, abdominal pain, or vomiting.  Endorses numbness and tingling in her left hand as well as decreased range of motion with abduction of the left shoulder, symptoms feel similar to prior episodes.  Using tramadol at home without any relief.  No other systemic complaints.      REVIEW OF SYSTEMS   A review of systems was performed and was negative except as documented in the HPI.     PAST MEDICAL HISTORY     Past Medical History:   Diagnosis Date    Asthma     Diabetes (HCC)     GERD (gastroesophageal reflux disease)     Hypertension        PAST SURGICAL HISTORY     Past Surgical History:   Procedure Laterality Date    ENDOMETRIAL CRYOABLATION N/A 2020    SALPINGECTOMY Bilateral 2018       ALLERGIES   Diclofenac    FAMILY HISTORY   No family history on file.    SOCIAL HISTORY     Social History     Socioeconomic History    Marital status:    Tobacco Use    Smoking status: Never    Smokeless tobacco: Never   Substance and Sexual Activity    Alcohol use: Not Currently    Drug use: Never     Social Determinants of Health

## 2024-01-06 NOTE — ED NOTES
Pt was discharged by Dr Rachel Lenz  Pt verbalized good understanding of all discharge instructions, prescriptions, and follow up care.   All questions answered.  Pt in stable condition on discharge.

## 2024-01-31 ENCOUNTER — TELEPHONE (OUTPATIENT)
Facility: CLINIC | Age: 39
End: 2024-01-31

## 2024-01-31 NOTE — TELEPHONE ENCOUNTER
Toujeo approved on 01/31/2024 to 01/30/2025 ref #[id number]  Mounjaro sent to pharmacist for review ref #[id number]

## 2024-01-31 NOTE — TELEPHONE ENCOUNTER
Pt returning call from [place]. Pt reached out to her insurance for pre approval for new medication,  they advised to call them at [place]     Phone [phone]    Fax [phone]

## 2024-02-05 ENCOUNTER — PATIENT MESSAGE (OUTPATIENT)
Facility: CLINIC | Age: 39
End: 2024-02-05

## 2024-02-06 NOTE — TELEPHONE ENCOUNTER
From: Sobia Hermosillo  To: Roseline Chaidez  Sent: 2/5/2024 8:14 PM EST  Subject: Mounjaro     Hello  Any updates on my medication?  Thank you

## 2024-04-10 ENCOUNTER — TELEPHONE (OUTPATIENT)
Facility: CLINIC | Age: 39
End: 2024-04-10

## 2024-04-10 NOTE — PROGRESS NOTES
Sobia Hermosillo, was evaluated through a synchronous (real-time) audio-video encounter. The patient (or guardian if applicable) is aware that this is a billable service, which includes applicable co-pays. This Virtual Visit was conducted with patient's (and/or legal guardian's) consent. Patient identification was verified, and a caregiver was present when appropriate.   The patient was located at Other: work in [facility]  Provider was located at Facility (Appt Dept): [address]  [address]  [place] [address]  Confirm you are appropriately licensed, registered, or certified to deliver care in the state where the patient is located as indicated above. If you are not or unsure, please re-schedule the visit: Yes, I confirm.     Sobia Hermosillo (:  1985) is a Established patient, presenting virtually for evaluation of the following:    Assessment & Plan   Below is the assessment and plan developed based on review of pertinent history, physical exam, labs, studies, and medications.  1. Uncontrolled type 2 diabetes mellitus with hyperglycemia, with long-term current use of insulin ([facility])  Assessment & Plan:   Unclear control, changes made today: Increasing Mounjaro to 5 mg.  Patient agrees to obtain labs in the next week.  Will fax lab order to [facility].  Orders:  -     Lipid Panel; Future  -     Microalbumin / Creatinine Urine Ratio; Future  -     Hemoglobin A1C; Future  -     CBC; Future  -     Comprehensive Metabolic Panel; Future  -     Tirzepatide (MOUNJARO) 5 MG/0.5ML SOPN SC injection; Inject 0.5 mLs into the skin once a week, Disp-2 mL, R-0Normal  -     [facility] - Zuly Urrutia MD, Bariatrics (non Surgical), [facility] ([facility] [facility] [address])  2. Class 2 severe obesity due to excess calories with serious comorbidity and body mass index (BMI) of 35.0 to 35.9 in adult (HCC)  Assessment & Plan:  encouraged weight management program.  Will print provide referral today.  Patient

## 2024-04-12 ENCOUNTER — TELEMEDICINE (OUTPATIENT)
Facility: CLINIC | Age: 39
End: 2024-04-12
Payer: MEDICAID

## 2024-04-12 DIAGNOSIS — E66.01 CLASS 2 SEVERE OBESITY DUE TO EXCESS CALORIES WITH SERIOUS COMORBIDITY AND BODY MASS INDEX (BMI) OF 35.0 TO 35.9 IN ADULT (HCC): ICD-10-CM

## 2024-04-12 DIAGNOSIS — R51.9 ACUTE NONINTRACTABLE HEADACHE, UNSPECIFIED HEADACHE TYPE: ICD-10-CM

## 2024-04-12 DIAGNOSIS — E11.65 UNCONTROLLED TYPE 2 DIABETES MELLITUS WITH HYPERGLYCEMIA, WITH LONG-TERM CURRENT USE OF INSULIN (HCC): Primary | ICD-10-CM

## 2024-04-12 DIAGNOSIS — Z79.4 UNCONTROLLED TYPE 2 DIABETES MELLITUS WITH HYPERGLYCEMIA, WITH LONG-TERM CURRENT USE OF INSULIN (HCC): Primary | ICD-10-CM

## 2024-04-12 DIAGNOSIS — E11.65 UNCONTROLLED TYPE 2 DIABETES MELLITUS WITH HYPERGLYCEMIA, WITH LONG-TERM CURRENT USE OF INSULIN (HCC): ICD-10-CM

## 2024-04-12 DIAGNOSIS — Z79.4 UNCONTROLLED TYPE 2 DIABETES MELLITUS WITH HYPERGLYCEMIA, WITH LONG-TERM CURRENT USE OF INSULIN (HCC): ICD-10-CM

## 2024-04-12 PROBLEM — E66.812 CLASS 2 SEVERE OBESITY DUE TO EXCESS CALORIES WITH SERIOUS COMORBIDITY AND BODY MASS INDEX (BMI) OF 35.0 TO 35.9 IN ADULT: Status: ACTIVE | Noted: 2024-04-12

## 2024-04-12 PROCEDURE — 99214 OFFICE O/P EST MOD 30 MIN: CPT | Performed by: FAMILY MEDICINE

## 2024-04-12 RX ORDER — TIRZEPATIDE 5 MG/.5ML
5 INJECTION, SOLUTION SUBCUTANEOUS WEEKLY
Qty: 2 ML | Refills: 0 | Status: SHIPPED | OUTPATIENT
Start: 2024-04-12

## 2024-04-12 ASSESSMENT — PATIENT HEALTH QUESTIONNAIRE - PHQ9
SUM OF ALL RESPONSES TO PHQ QUESTIONS 1-9: 0
2. FEELING DOWN, DEPRESSED OR HOPELESS: NOT AT ALL
SUM OF ALL RESPONSES TO PHQ QUESTIONS 1-9: 0
1. LITTLE INTEREST OR PLEASURE IN DOING THINGS: NOT AT ALL
SUM OF ALL RESPONSES TO PHQ QUESTIONS 1-9: 0
SUM OF ALL RESPONSES TO PHQ9 QUESTIONS 1 & 2: 0
SUM OF ALL RESPONSES TO PHQ QUESTIONS 1-9: 0

## 2024-04-12 ASSESSMENT — ENCOUNTER SYMPTOMS: RESPIRATORY NEGATIVE: 1

## 2024-04-12 NOTE — ASSESSMENT & PLAN NOTE
Discussed supportive care, encourage copious hydration.  Patient to return if symptoms persist or worsen.

## 2024-04-12 NOTE — ASSESSMENT & PLAN NOTE
encouraged weight management program.  Will print provide referral today.  Patient is amenable to this.  Discussed that there are frequent check-in's encourage compliance.  Patient is able to verbalize understanding.

## 2024-04-12 NOTE — PROGRESS NOTES
Chief Complaint   Patient presents with    Headache     Sobia Hermosillo is a 38 y.o. female who presents for a follow up on constant headaches. She reports having been evaluated at Urgent Care in [place].    Patient reports treating symptoms with Advil.     \"Have you been to the ER, urgent care clinic since your last visit?  Hospitalized since your last visit?\"    NO    “Have you seen or consulted any other health care providers outside of [facility] since your last visit?”    NO        “Have you had a pap smear?”    NO    No cervical cancer screening on file

## 2024-04-12 NOTE — ASSESSMENT & PLAN NOTE
Unclear control, changes made today: Increasing Mounjaro to 5 mg.  Patient agrees to obtain labs in the next week.  Will fax lab order to [facility] [place].

## 2024-04-15 ENCOUNTER — TELEPHONE (OUTPATIENT)
Age: 39
End: 2024-04-15

## 2024-04-18 LAB
ALBUMIN SERPL-MCNC: 4.5 G/DL (ref 3.9–4.9)
ALBUMIN/CREAT UR: 9 MG/G CREAT (ref 0–29)
ALBUMIN/GLOB SERPL: 1.8 {RATIO} (ref 1.2–2.2)
ALP SERPL-CCNC: 63 IU/L (ref 44–121)
ALT SERPL-CCNC: 30 IU/L (ref 0–32)
AST SERPL-CCNC: 21 IU/L (ref 0–40)
BILIRUB SERPL-MCNC: 0.5 MG/DL (ref 0–1.2)
BUN SERPL-MCNC: 10 MG/DL (ref 6–20)
BUN/CREAT SERPL: 16 (ref 9–23)
CALCIUM SERPL-MCNC: 9.5 MG/DL (ref 8.7–10.2)
CHLORIDE SERPL-SCNC: 103 MMOL/L (ref 96–106)
CHOLEST SERPL-MCNC: 174 MG/DL (ref 100–199)
CO2 SERPL-SCNC: 23 MMOL/L (ref 20–29)
CREAT SERPL-MCNC: 0.64 MG/DL (ref 0.57–1)
CREAT UR-MCNC: 80.2 MG/DL
EGFRCR SERPLBLD CKD-EPI 2021: 115 ML/MIN/1.73
ERYTHROCYTE [DISTWIDTH] IN BLOOD BY AUTOMATED COUNT: 13.6 % (ref 11.7–15.4)
GLOBULIN SER CALC-MCNC: 2.5 G/DL (ref 1.5–4.5)
GLUCOSE SERPL-MCNC: 149 MG/DL (ref 70–99)
HBA1C MFR BLD: 8.3 % (ref 4.8–5.6)
HCT VFR BLD AUTO: 48 % (ref 34–46.6)
HDLC SERPL-MCNC: 38 MG/DL
HGB BLD-MCNC: 15.6 G/DL (ref 11.1–15.9)
LDLC SERPL CALC-MCNC: 113 MG/DL (ref 0–99)
MCH RBC QN AUTO: 28.3 PG (ref 26.6–33)
MCHC RBC AUTO-ENTMCNC: 32.5 G/DL (ref 31.5–35.7)
MCV RBC AUTO: 87 FL (ref 79–97)
MICROALBUMIN UR-MCNC: 7.5 UG/ML
PLATELET # BLD AUTO: 272 X10E3/UL (ref 150–450)
POTASSIUM SERPL-SCNC: 4.4 MMOL/L (ref 3.5–5.2)
PROT SERPL-MCNC: 7 G/DL (ref 6–8.5)
RBC # BLD AUTO: 5.51 X10E6/UL (ref 3.77–5.28)
SODIUM SERPL-SCNC: 139 MMOL/L (ref 134–144)
TRIGL SERPL-MCNC: 126 MG/DL (ref 0–149)
VLDLC SERPL CALC-MCNC: 23 MG/DL (ref 5–40)
WBC # BLD AUTO: 7.8 X10E3/UL (ref 3.4–10.8)

## 2024-04-18 NOTE — RESULT ENCOUNTER NOTE
I think the last A1c I had at [facility] was 8.4%, so it is going down and not up.  I hope with increasing the Mounjaro it will go down more.  The other labs look pretty good.  The cholesterol is a little bit higher than we would like to see.  Have you scheduled with the weight management program yet?  Hope you are well!  Keep up the great work, Roseline Chaidez, APRN - CNP

## 2024-04-19 LAB
IMP & REVIEW OF LAB RESULTS: NORMAL
Lab: NORMAL

## 2024-04-23 ENCOUNTER — HOSPITAL ENCOUNTER (EMERGENCY)
Facility: HOSPITAL | Age: 39
Discharge: HOME | End: 2024-04-23
Attending: EMERGENCY MEDICINE
Payer: MEDICAID

## 2024-04-23 VITALS
OXYGEN SATURATION: 98 % | HEART RATE: 85 BPM | DIASTOLIC BLOOD PRESSURE: 79 MMHG | SYSTOLIC BLOOD PRESSURE: 132 MMHG | TEMPERATURE: 98.4 F | BODY MASS INDEX: 36.88 KG/M2 | RESPIRATION RATE: 16 BRPM | HEIGHT: 64 IN | WEIGHT: 216 LBS

## 2024-04-23 DIAGNOSIS — R51.9 NONINTRACTABLE EPISODIC HEADACHE, UNSPECIFIED HEADACHE TYPE: Primary | ICD-10-CM

## 2024-04-23 PROCEDURE — 96375 TX/PRO/DX INJ NEW DRUG ADDON: CPT

## 2024-04-23 PROCEDURE — 96365 THER/PROPH/DIAG IV INF INIT: CPT

## 2024-04-23 PROCEDURE — 2580000003 HC RX 258

## 2024-04-23 PROCEDURE — 99284 EMERGENCY DEPT VISIT MOD MDM: CPT

## 2024-04-23 PROCEDURE — 6360000002 HC RX W HCPCS

## 2024-04-23 RX ORDER — 0.9 % SODIUM CHLORIDE 0.9 %
1000 INTRAVENOUS SOLUTION INTRAVENOUS ONCE
Status: COMPLETED | OUTPATIENT
Start: 2024-04-23 | End: 2024-04-23

## 2024-04-23 RX ORDER — PROCHLORPERAZINE EDISYLATE 5 MG/ML
10 INJECTION INTRAMUSCULAR; INTRAVENOUS ONCE
Status: COMPLETED | OUTPATIENT
Start: 2024-04-23 | End: 2024-04-23

## 2024-04-23 RX ORDER — MAGNESIUM SULFATE 1 G/100ML
1000 INJECTION INTRAVENOUS ONCE
Status: COMPLETED | OUTPATIENT
Start: 2024-04-23 | End: 2024-04-23

## 2024-04-23 RX ORDER — DIPHENHYDRAMINE HYDROCHLORIDE 50 MG/ML
25 INJECTION INTRAMUSCULAR; INTRAVENOUS ONCE
Status: COMPLETED | OUTPATIENT
Start: 2024-04-23 | End: 2024-04-23

## 2024-04-23 RX ORDER — KETOROLAC TROMETHAMINE 15 MG/ML
15 INJECTION, SOLUTION INTRAMUSCULAR; INTRAVENOUS ONCE
Status: COMPLETED | OUTPATIENT
Start: 2024-04-23 | End: 2024-04-23

## 2024-04-23 RX ADMIN — MAGNESIUM SULFATE HEPTAHYDRATE 1000 MG: 1 INJECTION, SOLUTION INTRAVENOUS at 13:07

## 2024-04-23 RX ADMIN — SODIUM CHLORIDE 1000 ML: 9 INJECTION, SOLUTION INTRAVENOUS at 13:01

## 2024-04-23 RX ADMIN — PROCHLORPERAZINE EDISYLATE 10 MG: 5 INJECTION INTRAMUSCULAR; INTRAVENOUS at 13:04

## 2024-04-23 RX ADMIN — DIPHENHYDRAMINE HYDROCHLORIDE 25 MG: 50 INJECTION INTRAMUSCULAR; INTRAVENOUS at 13:02

## 2024-04-23 RX ADMIN — KETOROLAC TROMETHAMINE 15 MG: 15 INJECTION, SOLUTION INTRAMUSCULAR; INTRAVENOUS at 13:03

## 2024-04-23 ASSESSMENT — PAIN - FUNCTIONAL ASSESSMENT: PAIN_FUNCTIONAL_ASSESSMENT: 0-10

## 2024-04-23 ASSESSMENT — PAIN DESCRIPTION - LOCATION
LOCATION: HEAD
LOCATION: HAND

## 2024-04-23 ASSESSMENT — PAIN DESCRIPTION - DESCRIPTORS: DESCRIPTORS: ACHING

## 2024-04-23 ASSESSMENT — PAIN SCALES - GENERAL
PAINLEVEL_OUTOF10: 7
PAINLEVEL_OUTOF10: 7

## 2024-04-23 ASSESSMENT — PAIN DESCRIPTION - ORIENTATION: ORIENTATION: LEFT;RIGHT

## 2024-04-23 ASSESSMENT — ENCOUNTER SYMPTOMS: CHEST TIGHTNESS: 0

## 2024-04-23 NOTE — ED TRIAGE NOTES
Pt arrives to the ER for complaints of headaches for the last two weeks that have been intermittent. Pt reports that the headache is on both the right and left side.     Pt reports taking Excedrin with no relief.     Denies any visual changes, dizziness

## 2024-04-23 NOTE — ED PROVIDER NOTES
returned as of this dictation.    EMERGENCY DEPARTMENT COURSE and DIFFERENTIAL DIAGNOSIS/MDM:   Vitals:    Vitals:    04/23/24 1239 04/23/24 1240 04/23/24 1303 04/23/24 1433   BP:  (!) 155/93 132/79    Pulse: 85      Resp: 16      Temp: 98.4 °F (36.9 °C)      TempSrc: Oral      SpO2: 99%  100% 98%   Weight: 98 kg (216 lb)      Height: 1.626 m (5' 4\")              Medical Decision Making  38 year old female reports to ED with intermittent headaches over past 2 weeks.  This patient presents with a headache most consistent with benign headache from either tension type headache vs migraine.  No headache red flags.  No neurodeficits noted.  No concern acute angle closure glaucoma.  Carotid artery dissection not likely given no focal neuro deficits, no neck trauma or recent neck strain.  Upon reexamination, patient felt much improved after headache cocktail of Toradol, Compazine, magnesium, and IV fluids and patient discharged with PCP follow-up.  Patient has an appointment with PCP tomorrow which she intends to keep.  Strict return precautions given.    Discussed my clinical impression(s) for any labs and/or radiology results with the patient.  I answered any questions and addressed any concerns.  Discussed the importance of following up with their primary care physician and/or specialist(s).  Discussed signs or symptoms that would warrant return back to the ED for further evaluation.  The patient is agreeable with discharge.      Risk  Prescription drug management.            REASSESSMENT            CONSULTS:  None    PROCEDURES:  Unless otherwise noted below, none     Procedures      FINAL IMPRESSION      1. Nonintractable episodic headache, unspecified headache type          DISPOSITION/PLAN   DISPOSITION Decision To Discharge 04/23/2024 02:27:59 PM      PATIENT REFERRED TO:  Roseline Chaidez, APRN - CNP  [id number] [address] [id number] [phone]    Schedule an appointment as soon as possible for

## 2024-04-23 NOTE — DISCHARGE INSTRUCTIONS
Please continue to stay hydrated and take over-the-counter pain medicine such as Excedrin and Tylenol for any headache you experience.  If symptoms worsen or new concerning symptoms arise, please report to the nearest emergency department.    Thank you for allowing us to provide you with medical care today.  We realize that you have many choices for your emergency care needs.  We thank you for choosing [facility].  Please choose us in the future for any continued health care needs.     The exam and treatment you received in the Emergency Department were for an emergent problem and are not intended as complete care. It is important that you follow up with a doctor, nurse practitioner, or physician's assistant for ongoing care. If your symptoms worsen or you do not improve as expected and you are unable to reach your usual health care provider, you should return to the Emergency Department.  We are available 24 hours a day.     Please make an appointment with your health care provider(s) for follow up of your Emergency Department visit.  Take this sheet with you when you go to your follow-up visit.

## 2024-04-24 ENCOUNTER — TELEMEDICINE (OUTPATIENT)
Facility: CLINIC | Age: 39
End: 2024-04-24
Payer: MEDICAID

## 2024-04-24 DIAGNOSIS — Z79.4 UNCONTROLLED TYPE 2 DIABETES MELLITUS WITH HYPERGLYCEMIA, WITH LONG-TERM CURRENT USE OF INSULIN (HCC): ICD-10-CM

## 2024-04-24 DIAGNOSIS — R51.9 ACUTE NONINTRACTABLE HEADACHE, UNSPECIFIED HEADACHE TYPE: Primary | ICD-10-CM

## 2024-04-24 DIAGNOSIS — E11.65 UNCONTROLLED TYPE 2 DIABETES MELLITUS WITH HYPERGLYCEMIA, WITH LONG-TERM CURRENT USE OF INSULIN (HCC): ICD-10-CM

## 2024-04-24 PROCEDURE — 3052F HG A1C>EQUAL 8.0%<EQUAL 9.0%: CPT | Performed by: FAMILY MEDICINE

## 2024-04-24 PROCEDURE — 99213 OFFICE O/P EST LOW 20 MIN: CPT | Performed by: FAMILY MEDICINE

## 2024-04-24 RX ORDER — SUMATRIPTAN 50 MG/1
50 TABLET, FILM COATED ORAL
Qty: 9 TABLET | Refills: 0 | Status: SHIPPED | OUTPATIENT
Start: 2024-04-24 | End: 2024-04-24

## 2024-04-24 ASSESSMENT — PATIENT HEALTH QUESTIONNAIRE - PHQ9
SUM OF ALL RESPONSES TO PHQ9 QUESTIONS 1 & 2: 0
SUM OF ALL RESPONSES TO PHQ QUESTIONS 1-9: 0
1. LITTLE INTEREST OR PLEASURE IN DOING THINGS: NOT AT ALL
SUM OF ALL RESPONSES TO PHQ QUESTIONS 1-9: 0
2. FEELING DOWN, DEPRESSED OR HOPELESS: NOT AT ALL

## 2024-04-24 ASSESSMENT — ENCOUNTER SYMPTOMS: RESPIRATORY NEGATIVE: 1

## 2024-04-24 NOTE — PROGRESS NOTES
Sobia Hermosillo, was evaluated through a synchronous (real-time) audio-video encounter. The patient (or guardian if applicable) is aware that this is a billable service, which includes applicable co-pays. This Virtual Visit was conducted with patient's (and/or legal guardian's) consent. Patient identification was verified, and a caregiver was present when appropriate.   The patient was located at Other: work in [facility]  Provider was located at Facility (Appt Dept): [address]  [address]  [place] [address]  Confirm you are appropriately licensed, registered, or certified to deliver care in the state where the patient is located as indicated above. If you are not or unsure, please re-schedule the visit: Yes, I confirm.     Sobia Hermosillo (:  1985) is a Established patient, presenting virtually for evaluation of the following:    Assessment & Plan   Below is the assessment and plan developed based on review of pertinent history, physical exam, labs, studies, and medications.  1. Acute nonintractable headache, unspecified headache type  -     Patient was advised to start Imitrex at first sign of a headache.  She may repeat in 1 hour if no improvement in symptoms.  Advised patient to reach out if she finds she needs to take this medication frequently.  Encourage copious hydration, discussed use of magnesium glycinate supplement to support migraine prevention.  No red flags per ER note.  Will reassess at follow-up.    - SUMAtriptan (IMITREX) 50 MG tablet; Take 1 tablet by mouth once as needed for Migraine, Disp-9 tablet, R-0Normal  2. Uncontrolled type 2 diabetes mellitus with hyperglycemia, with long-term current use of insulin (HCC)  -Patient is awaiting appointment with a nutritionist and on the [place].  She also has scheduled appointment with Dr. Madalyn rosales at [facility] in .    Return in about 3 weeks (around 5/15/2024) for headaches, virtual visit.       Subjective   Headache    Sobia DAVIES

## 2024-04-24 NOTE — PATIENT INSTRUCTIONS
I am sending a prescription for imitrex. This is a medication to be taken at first sign of a headache.  Take this tablet with a full glass of water.  If your symptoms persist after 1 hour you can take an additional tablet.  Do not take more than 2 tablets in 24 hours.     If you find you are using this multiple times a week please call me before your next appointment.     I would also like you to look for a supplement called magnesium glycinate.  Magnesium has been shown to reduce headache frequency and duration.  Glycinate is the formulation that will not cause diarrhea (don't use magnesium oxide or other formulations which can cause diarrhea).

## 2024-04-24 NOTE — PROGRESS NOTES
Chief Complaint   Patient presents with    Follow-up     Sobia Hermosillo is a 38 y.o. female who presents for a hospital follow up.       \"Have you been to the ER, urgent care clinic since your last visit?  Hospitalized since your last visit?\"    YES - When: approximately 1 days ago.  Where and Why: ED for headaches.    “Have you seen or consulted any other health care providers outside of [facility] since your last visit?”    NO     “Have you had a pap smear?”    NO    No cervical cancer screening on file

## 2024-07-08 DIAGNOSIS — Z79.4 UNCONTROLLED TYPE 2 DIABETES MELLITUS WITH HYPERGLYCEMIA, WITH LONG-TERM CURRENT USE OF INSULIN (HCC): ICD-10-CM

## 2024-07-08 DIAGNOSIS — E11.65 UNCONTROLLED TYPE 2 DIABETES MELLITUS WITH HYPERGLYCEMIA, WITH LONG-TERM CURRENT USE OF INSULIN (HCC): ICD-10-CM

## 2024-07-09 DIAGNOSIS — Z79.4 UNCONTROLLED TYPE 2 DIABETES MELLITUS WITH HYPERGLYCEMIA, WITH LONG-TERM CURRENT USE OF INSULIN (HCC): ICD-10-CM

## 2024-07-09 DIAGNOSIS — E11.65 UNCONTROLLED TYPE 2 DIABETES MELLITUS WITH HYPERGLYCEMIA, WITH LONG-TERM CURRENT USE OF INSULIN (HCC): ICD-10-CM

## 2024-07-09 RX ORDER — INSULIN GLARGINE 300 U/ML
70 INJECTION, SOLUTION SUBCUTANEOUS NIGHTLY
Qty: 15 ML | Refills: 1 | Status: SHIPPED | OUTPATIENT
Start: 2024-07-09

## 2024-07-09 RX ORDER — TIRZEPATIDE 5 MG/.5ML
5 INJECTION, SOLUTION SUBCUTANEOUS WEEKLY
Qty: 2 ML | Refills: 0 | Status: SHIPPED | OUTPATIENT
Start: 2024-07-09 | End: 2024-07-09 | Stop reason: DRUGHIGH

## 2024-07-09 RX ORDER — TIRZEPATIDE 10 MG/.5ML
10 INJECTION, SOLUTION SUBCUTANEOUS WEEKLY
Qty: 0.5 ML | Refills: 2 | OUTPATIENT
Start: 2024-07-09

## 2024-07-09 NOTE — TELEPHONE ENCOUNTER
Called pt.   Per pt, she will call office back to schedule her follow up due to work schedule.     Pt does ask for the increased dosage of Mounjaro.

## 2024-07-19 ENCOUNTER — TELEMEDICINE (OUTPATIENT)
Facility: CLINIC | Age: 39
End: 2024-07-19
Payer: MEDICAID

## 2024-07-19 DIAGNOSIS — Z79.4 UNCONTROLLED TYPE 2 DIABETES MELLITUS WITH HYPERGLYCEMIA, WITH LONG-TERM CURRENT USE OF INSULIN (HCC): ICD-10-CM

## 2024-07-19 DIAGNOSIS — R05.8 POST-VIRAL COUGH SYNDROME: Primary | ICD-10-CM

## 2024-07-19 DIAGNOSIS — E11.65 UNCONTROLLED TYPE 2 DIABETES MELLITUS WITH HYPERGLYCEMIA, WITH LONG-TERM CURRENT USE OF INSULIN (HCC): ICD-10-CM

## 2024-07-19 PROCEDURE — 99213 OFFICE O/P EST LOW 20 MIN: CPT | Performed by: FAMILY MEDICINE

## 2024-07-19 PROCEDURE — 3052F HG A1C>EQUAL 8.0%<EQUAL 9.0%: CPT | Performed by: FAMILY MEDICINE

## 2024-07-19 RX ORDER — ROSUVASTATIN CALCIUM 5 MG/1
5 TABLET, COATED ORAL DAILY
COMMUNITY
Start: 2024-04-25

## 2024-07-19 RX ORDER — DICLOFENAC SODIUM 75 MG/1
1 TABLET, DELAYED RELEASE ORAL 2 TIMES DAILY
COMMUNITY
End: 2024-07-19

## 2024-07-19 RX ORDER — ALBUTEROL SULFATE 90 UG/1
1 AEROSOL, METERED RESPIRATORY (INHALATION) EVERY 4 HOURS PRN
Qty: 18 G | Refills: 1 | Status: SHIPPED | OUTPATIENT
Start: 2024-07-19

## 2024-07-19 RX ORDER — MELOXICAM 15 MG/1
15 TABLET ORAL DAILY
COMMUNITY
End: 2024-07-19

## 2024-07-19 RX ORDER — TRAMADOL HYDROCHLORIDE 50 MG/1
50 TABLET ORAL EVERY 8 HOURS PRN
COMMUNITY

## 2024-07-19 RX ORDER — BENZONATATE 200 MG/1
200 CAPSULE ORAL 3 TIMES DAILY PRN
Qty: 30 CAPSULE | Refills: 0 | Status: SHIPPED | OUTPATIENT
Start: 2024-07-19 | End: 2024-07-29

## 2024-07-19 NOTE — PROGRESS NOTES
Chief Complaint   Patient presents with    Post-COVID Symptoms     Sobia Hermosillo is a 38 y.o. female who presents for medical evaluation related to post COVID-19 symptoms.      \"Have you been to the ER, urgent care clinic since your last visit?  Hospitalized since your last visit?\"    NO    “Have you seen or consulted any other health care providers outside of [facility] since your last visit?”    NO     “Have you had a pap smear?”    NO    No cervical cancer screening on file

## 2024-07-19 NOTE — PROGRESS NOTES
Sobia Hermosillo, was evaluated through a synchronous (real-time) audio-video encounter. The patient (or guardian if applicable) is aware that this is a billable service, which includes applicable co-pays. This Virtual Visit was conducted with patient's (and/or legal guardian's) consent. Patient identification was verified, and a caregiver was present when appropriate.   The patient was located at Other: work in [employer]  Provider was located at Facility (Appt Dept): [address] [facility]  [address]  [place] [address]  Confirm you are appropriately licensed, registered, or certified to deliver care in the state where the patient is located as indicated above. If you are not or unsure, please re-schedule the visit: Yes, I confirm.     Sobia Hermosillo (: 1985) is a Established patient, presenting virtually for evaluation of the following:    ASSESSMENT & PLAN:  Below is the assessment and plan developed based on review of pertinent history, physical exam, labs, studies, and medications.  Assessment & Plan    1. Post-viral cough syndrome  -    Patient negative for any acute or infectious signs or symptoms today.  Discussed likelihood of postviral cough.  Reviewed supportive care with patient.  As she is using breathing treatments at home, discussed use of albuterol nebulizer.  Will also send Tessalon Perles.  Discussed patho of cough reflex and supportive care.  - Albuterol sulfate HFA (PROVENTIL;VENTOLIN;PROAIR) 108 (90 Base) MCG/ACT inhaler; Inhale 1 puff into the lungs every 4 hours as needed for Wheezing, Disp-18 g, R-1Normal  -     benzonatate (TESSALON) 200 MG capsule; Take 1 capsule by mouth 3 times daily as needed for Cough, Disp-30 capsule, R-0Normal  2. Uncontrolled type 2 diabetes mellitus with hyperglycemia, with long-term current use of insulin (HCC)  -      Unclear control, continue current medications, lifestyle modifications recommended, and will enter labs to be done prior to next

## 2024-07-19 NOTE — PATIENT INSTRUCTIONS
What causes a lingering cough after a cold?  Coughs that persist after a common cold or other upper respiratory infection are called post-infectious or post-viral coughs. They can linger for three to eight weeks after a viral infection.   There are two common causes of a post-viral cough in adults:  Postnasal drip, when mucus drains into your throat  Inflammation, or swollen airways, related to the initial respiratory infection  Postnasal drip during the day can irritate your throat and vocal cords. But Dr. Kingston says nighttime makes it much worse. “When you’re lying flat at night, mucus runs down the back of your throat and into your lungs,” he says. “It can cause chest congestion that needs to be coughed up. When mucus travels down your throat, it can also cause irritation and inflammation, making you cough.”  Most coughs following an upper respiratory infection are caused by the infection itself. But in some cases, the persistent cough may be a symptom of pre-existing asthma (made worse on by the recent virus) or a secondary infection that took hold while your immune system was distracted.  How to treat a persistent cough after a cold  During the first few weeks following a cold, you can treat lingering coughs with home remedies and over-the-counter (OTC) medication:  Humidifiers provide extra moisture to help soothe throats and nasal passages. Hold a steaming cup of water or tea under your face for the same effect.  OTC decongestants and nasal sprays reduce swelling and inflammation in your nose.   OTC cough syrup is best used at night since it can cause drowsiness.  Throat lozenges and cough drops stimulate your saliva production to soothe a sore throat. Lozenges containing menthol also help open nasal passages.  If your symptoms continue beyond a few weeks and nothing seems to be helping, your PCP may recommend a:  Prescription nasal spray, for ongoing nasal drip that is not responding to OTC

## 2024-08-07 ENCOUNTER — PATIENT MESSAGE (OUTPATIENT)
Facility: CLINIC | Age: 39
End: 2024-08-07

## 2024-08-07 DIAGNOSIS — E11.65 UNCONTROLLED TYPE 2 DIABETES MELLITUS WITH HYPERGLYCEMIA, WITH LONG-TERM CURRENT USE OF INSULIN (HCC): Primary | ICD-10-CM

## 2024-08-07 DIAGNOSIS — Z79.4 UNCONTROLLED TYPE 2 DIABETES MELLITUS WITH HYPERGLYCEMIA, WITH LONG-TERM CURRENT USE OF INSULIN (HCC): Primary | ICD-10-CM

## 2024-08-07 RX ORDER — ACYCLOVIR 400 MG/1
1 TABLET ORAL
Qty: 3 EACH | Refills: 12 | Status: SHIPPED | OUTPATIENT
Start: 2024-08-07

## 2024-08-07 RX ORDER — ACYCLOVIR 400 MG/1
TABLET ORAL
Qty: 1 EACH | Refills: 0 | Status: SHIPPED | OUTPATIENT
Start: 2024-08-07

## 2024-08-08 ENCOUNTER — TELEPHONE (OUTPATIENT)
Age: 39
End: 2024-08-08

## 2024-08-08 NOTE — TELEPHONE ENCOUNTER
Call #2- Contacted pt regarding [facility] referral via email. I sent the orientation and advised on the next steps.

## 2024-08-13 ENCOUNTER — TELEPHONE (OUTPATIENT)
Facility: CLINIC | Age: 39
End: 2024-08-13

## 2024-09-06 LAB
ALBUMIN/CREAT UR: 11 MG/G CREAT (ref 0–29)
BUN SERPL-MCNC: 11 MG/DL (ref 6–20)
BUN/CREAT SERPL: 17 (ref 9–23)
CALCIUM SERPL-MCNC: 9.4 MG/DL (ref 8.7–10.2)
CHLORIDE SERPL-SCNC: 103 MMOL/L (ref 96–106)
CO2 SERPL-SCNC: 21 MMOL/L (ref 20–29)
CREAT SERPL-MCNC: 0.64 MG/DL (ref 0.57–1)
CREAT UR-MCNC: 204.8 MG/DL
EGFRCR SERPLBLD CKD-EPI 2021: 115 ML/MIN/1.73
GLUCOSE SERPL-MCNC: 103 MG/DL (ref 70–99)
HBA1C MFR BLD: 8.6 % (ref 4.8–5.6)
MICROALBUMIN UR-MCNC: 22.5 UG/ML
POTASSIUM SERPL-SCNC: 4.3 MMOL/L (ref 3.5–5.2)
SODIUM SERPL-SCNC: 138 MMOL/L (ref 134–144)

## 2024-09-11 ENCOUNTER — TELEPHONE (OUTPATIENT)
Facility: CLINIC | Age: 39
End: 2024-09-11

## 2024-09-11 ENCOUNTER — TELEMEDICINE (OUTPATIENT)
Facility: CLINIC | Age: 39
End: 2024-09-11
Payer: MEDICAID

## 2024-09-11 DIAGNOSIS — Z79.4 UNCONTROLLED TYPE 2 DIABETES MELLITUS WITH HYPERGLYCEMIA, WITH LONG-TERM CURRENT USE OF INSULIN (HCC): ICD-10-CM

## 2024-09-11 DIAGNOSIS — E66.01 CLASS 2 SEVERE OBESITY DUE TO EXCESS CALORIES WITH SERIOUS COMORBIDITY AND BODY MASS INDEX (BMI) OF 35.0 TO 35.9 IN ADULT (HCC): Primary | ICD-10-CM

## 2024-09-11 DIAGNOSIS — E11.65 UNCONTROLLED TYPE 2 DIABETES MELLITUS WITH HYPERGLYCEMIA, WITH LONG-TERM CURRENT USE OF INSULIN (HCC): ICD-10-CM

## 2024-09-11 PROCEDURE — 99213 OFFICE O/P EST LOW 20 MIN: CPT | Performed by: FAMILY MEDICINE

## 2024-09-11 PROCEDURE — 3052F HG A1C>EQUAL 8.0%<EQUAL 9.0%: CPT | Performed by: FAMILY MEDICINE

## 2024-09-11 ASSESSMENT — PATIENT HEALTH QUESTIONNAIRE - PHQ9
SUM OF ALL RESPONSES TO PHQ QUESTIONS 1-9: 0
1. LITTLE INTEREST OR PLEASURE IN DOING THINGS: NOT AT ALL
SUM OF ALL RESPONSES TO PHQ QUESTIONS 1-9: 0
SUM OF ALL RESPONSES TO PHQ9 QUESTIONS 1 & 2: 0
SUM OF ALL RESPONSES TO PHQ QUESTIONS 1-9: 0
2. FEELING DOWN, DEPRESSED OR HOPELESS: NOT AT ALL
SUM OF ALL RESPONSES TO PHQ QUESTIONS 1-9: 0

## 2024-09-19 DIAGNOSIS — Z11.59 NEED FOR HEPATITIS C SCREENING TEST: ICD-10-CM

## 2024-09-19 DIAGNOSIS — Z11.4 SCREENING FOR HIV WITHOUT PRESENCE OF RISK FACTORS: ICD-10-CM

## 2024-09-20 ENCOUNTER — TELEMEDICINE (OUTPATIENT)
Facility: CLINIC | Age: 39
End: 2024-09-20
Payer: MEDICAID

## 2024-09-20 DIAGNOSIS — K04.7 DENTAL ABSCESS: ICD-10-CM

## 2024-09-20 DIAGNOSIS — Z79.4 UNCONTROLLED TYPE 2 DIABETES MELLITUS WITH HYPERGLYCEMIA, WITH LONG-TERM CURRENT USE OF INSULIN (HCC): Primary | ICD-10-CM

## 2024-09-20 DIAGNOSIS — E11.65 UNCONTROLLED TYPE 2 DIABETES MELLITUS WITH HYPERGLYCEMIA, WITH LONG-TERM CURRENT USE OF INSULIN (HCC): Primary | ICD-10-CM

## 2024-09-20 PROCEDURE — 3052F HG A1C>EQUAL 8.0%<EQUAL 9.0%: CPT | Performed by: FAMILY MEDICINE

## 2024-09-20 PROCEDURE — 99213 OFFICE O/P EST LOW 20 MIN: CPT | Performed by: FAMILY MEDICINE

## 2024-09-20 ASSESSMENT — PATIENT HEALTH QUESTIONNAIRE - PHQ9
SUM OF ALL RESPONSES TO PHQ9 QUESTIONS 1 & 2: 0
SUM OF ALL RESPONSES TO PHQ QUESTIONS 1-9: 0
SUM OF ALL RESPONSES TO PHQ QUESTIONS 1-9: 0
1. LITTLE INTEREST OR PLEASURE IN DOING THINGS: NOT AT ALL
SUM OF ALL RESPONSES TO PHQ QUESTIONS 1-9: 0
SUM OF ALL RESPONSES TO PHQ QUESTIONS 1-9: 0
2. FEELING DOWN, DEPRESSED OR HOPELESS: NOT AT ALL

## 2024-10-15 DIAGNOSIS — R51.9 ACUTE NONINTRACTABLE HEADACHE, UNSPECIFIED HEADACHE TYPE: ICD-10-CM

## 2024-10-15 RX ORDER — SUMATRIPTAN 50 MG/1
50 TABLET, FILM COATED ORAL DAILY PRN
Qty: 9 TABLET | Refills: 0 | Status: SHIPPED | OUTPATIENT
Start: 2024-10-15

## 2024-11-13 DIAGNOSIS — Z12.31 ENCOUNTER FOR SCREENING MAMMOGRAM FOR BREAST CANCER: ICD-10-CM

## 2024-11-13 DIAGNOSIS — Z11.59 NEED FOR HEPATITIS C SCREENING TEST: ICD-10-CM

## 2024-11-13 RX ORDER — TIRZEPATIDE 12.5 MG/.5ML
12.5 INJECTION, SOLUTION SUBCUTANEOUS WEEKLY
Qty: 2 ML | Refills: 1 | Status: SHIPPED | OUTPATIENT
Start: 2024-11-13

## 2024-11-13 NOTE — TELEPHONE ENCOUNTER
[facility] faxed request  [address]      MOUNJARO 10MG/0.5ML INJ    Inject 1 syringe subcutaneously once a week

## 2025-01-30 DIAGNOSIS — E11.65 UNCONTROLLED TYPE 2 DIABETES MELLITUS WITH HYPERGLYCEMIA, WITH LONG-TERM CURRENT USE OF INSULIN (HCC): ICD-10-CM

## 2025-01-30 DIAGNOSIS — Z79.4 UNCONTROLLED TYPE 2 DIABETES MELLITUS WITH HYPERGLYCEMIA, WITH LONG-TERM CURRENT USE OF INSULIN (HCC): ICD-10-CM

## 2025-01-30 NOTE — TELEPHONE ENCOUNTER
Pt made an appointment for 2/12/25 but is requesting labs to be ordered to be done before her appointment.     Pt also requesting refill of medications:     Insulin (toujeo max solostar) 300unit/ml  [facility] on [address] [facility]

## 2025-01-31 ENCOUNTER — TELEPHONE (OUTPATIENT)
Facility: CLINIC | Age: 40
End: 2025-01-31

## 2025-01-31 RX ORDER — INSULIN GLARGINE 300 U/ML
70 INJECTION, SOLUTION SUBCUTANEOUS NIGHTLY
Qty: 15 ML | Refills: 1 | Status: SHIPPED | OUTPATIENT
Start: 2025-01-31

## 2025-01-31 NOTE — TELEPHONE ENCOUNTER
Roseline Chaidez APRN - CNP   to Me       1/31/25 10:46 AM  We could prob just do POC A1C?           1/31/25 10:46 AM   Roseline Chaidez APRN - CNP signed an order  January 30, 2025  Landy Campos MA   to Roseline Chaidez APRN - CNP   AB      1/30/25 11:45 AM  Patient also asking if you can send orders for her to have blood work done before her appt

## 2025-01-31 NOTE — TELEPHONE ENCOUNTER
PHARMACY PRIOR AUTHORIZATION REQUEST #[id number]  [facility] reviewed your request for TOUJEO MAX SOLOSTR 300 UNIT/ML for the  [place] [facility] member identified above and has approved the request as follows:  TOUJEO MAX SOLOSTR 300 UNIT/ML: quantity/billable units of 15 , approved for 01/31/2025  through 01/31/2026

## 2025-01-31 NOTE — TELEPHONE ENCOUNTER
Called and spoke with pt, and she has been advised and states understanding of this and agreeable to plan.

## 2025-02-10 SDOH — ECONOMIC STABILITY: FOOD INSECURITY: WITHIN THE PAST 12 MONTHS, YOU WORRIED THAT YOUR FOOD WOULD RUN OUT BEFORE YOU GOT MONEY TO BUY MORE.: NEVER TRUE

## 2025-02-10 SDOH — ECONOMIC STABILITY: INCOME INSECURITY: IN THE LAST 12 MONTHS, WAS THERE A TIME WHEN YOU WERE NOT ABLE TO PAY THE MORTGAGE OR RENT ON TIME?: NO

## 2025-02-10 SDOH — ECONOMIC STABILITY: TRANSPORTATION INSECURITY
IN THE PAST 12 MONTHS, HAS THE LACK OF TRANSPORTATION KEPT YOU FROM MEDICAL APPOINTMENTS OR FROM GETTING MEDICATIONS?: NO

## 2025-02-10 SDOH — ECONOMIC STABILITY: FOOD INSECURITY: WITHIN THE PAST 12 MONTHS, THE FOOD YOU BOUGHT JUST DIDN'T LAST AND YOU DIDN'T HAVE MONEY TO GET MORE.: NEVER TRUE

## 2025-02-10 SDOH — ECONOMIC STABILITY: TRANSPORTATION INSECURITY
IN THE PAST 12 MONTHS, HAS LACK OF TRANSPORTATION KEPT YOU FROM MEETINGS, WORK, OR FROM GETTING THINGS NEEDED FOR DAILY LIVING?: NO

## 2025-02-14 ENCOUNTER — TELEPHONE (OUTPATIENT)
Facility: CLINIC | Age: 40
End: 2025-02-14

## 2025-02-14 ENCOUNTER — OFFICE VISIT (OUTPATIENT)
Facility: CLINIC | Age: 40
End: 2025-02-14
Payer: MEDICAID

## 2025-02-14 VITALS
WEIGHT: 216 LBS | TEMPERATURE: 98.2 F | SYSTOLIC BLOOD PRESSURE: 126 MMHG | OXYGEN SATURATION: 99 % | DIASTOLIC BLOOD PRESSURE: 86 MMHG | HEART RATE: 83 BPM | HEIGHT: 64 IN | BODY MASS INDEX: 36.88 KG/M2

## 2025-02-14 DIAGNOSIS — I10 ESSENTIAL HYPERTENSION, BENIGN: ICD-10-CM

## 2025-02-14 DIAGNOSIS — E11.65 CONTROLLED TYPE 2 DIABETES MELLITUS WITH HYPERGLYCEMIA, WITH LONG-TERM CURRENT USE OF INSULIN (HCC): Primary | ICD-10-CM

## 2025-02-14 DIAGNOSIS — M75.02 ADHESIVE CAPSULITIS OF LEFT SHOULDER: ICD-10-CM

## 2025-02-14 DIAGNOSIS — E11.65 CONTROLLED TYPE 2 DIABETES MELLITUS WITH HYPERGLYCEMIA, WITH LONG-TERM CURRENT USE OF INSULIN (HCC): ICD-10-CM

## 2025-02-14 DIAGNOSIS — Z12.31 ENCOUNTER FOR SCREENING MAMMOGRAM FOR BREAST CANCER: ICD-10-CM

## 2025-02-14 DIAGNOSIS — Z79.4 CONTROLLED TYPE 2 DIABETES MELLITUS WITH HYPERGLYCEMIA, WITH LONG-TERM CURRENT USE OF INSULIN (HCC): ICD-10-CM

## 2025-02-14 DIAGNOSIS — E66.01 CLASS 2 SEVERE OBESITY DUE TO EXCESS CALORIES WITH SERIOUS COMORBIDITY AND BODY MASS INDEX (BMI) OF 35.0 TO 35.9 IN ADULT: ICD-10-CM

## 2025-02-14 DIAGNOSIS — E66.812 CLASS 2 SEVERE OBESITY DUE TO EXCESS CALORIES WITH SERIOUS COMORBIDITY AND BODY MASS INDEX (BMI) OF 35.0 TO 35.9 IN ADULT: ICD-10-CM

## 2025-02-14 DIAGNOSIS — Z79.4 CONTROLLED TYPE 2 DIABETES MELLITUS WITH HYPERGLYCEMIA, WITH LONG-TERM CURRENT USE OF INSULIN (HCC): Primary | ICD-10-CM

## 2025-02-14 DIAGNOSIS — J45.40 MODERATE PERSISTENT ASTHMA WITHOUT COMPLICATION: ICD-10-CM

## 2025-02-14 PROCEDURE — 99214 OFFICE O/P EST MOD 30 MIN: CPT | Performed by: FAMILY MEDICINE

## 2025-02-14 PROCEDURE — 3074F SYST BP LT 130 MM HG: CPT | Performed by: FAMILY MEDICINE

## 2025-02-14 PROCEDURE — 3079F DIAST BP 80-89 MM HG: CPT | Performed by: FAMILY MEDICINE

## 2025-02-14 RX ORDER — TIRZEPATIDE 12.5 MG/.5ML
12.5 INJECTION, SOLUTION SUBCUTANEOUS WEEKLY
Qty: 2 ML | Refills: 3 | Status: SHIPPED | OUTPATIENT
Start: 2025-02-14 | End: 2025-02-14 | Stop reason: DRUGHIGH

## 2025-02-14 RX ORDER — TIRZEPATIDE 15 MG/.5ML
15 INJECTION, SOLUTION SUBCUTANEOUS WEEKLY
Qty: 2 ML | Refills: 3 | Status: SHIPPED | OUTPATIENT
Start: 2025-02-14

## 2025-02-14 SDOH — ECONOMIC STABILITY: FOOD INSECURITY: WITHIN THE PAST 12 MONTHS, YOU WORRIED THAT YOUR FOOD WOULD RUN OUT BEFORE YOU GOT MONEY TO BUY MORE.: NEVER TRUE

## 2025-02-14 SDOH — ECONOMIC STABILITY: FOOD INSECURITY: WITHIN THE PAST 12 MONTHS, THE FOOD YOU BOUGHT JUST DIDN'T LAST AND YOU DIDN'T HAVE MONEY TO GET MORE.: NEVER TRUE

## 2025-02-14 SDOH — ECONOMIC STABILITY: INCOME INSECURITY: IN THE LAST 12 MONTHS, WAS THERE A TIME WHEN YOU WERE NOT ABLE TO PAY THE MORTGAGE OR RENT ON TIME?: NO

## 2025-02-14 ASSESSMENT — PATIENT HEALTH QUESTIONNAIRE - PHQ9
1. LITTLE INTEREST OR PLEASURE IN DOING THINGS: NOT AT ALL
SUM OF ALL RESPONSES TO PHQ QUESTIONS 1-9: 0
2. FEELING DOWN, DEPRESSED OR HOPELESS: NOT AT ALL
SUM OF ALL RESPONSES TO PHQ QUESTIONS 1-9: 0
SUM OF ALL RESPONSES TO PHQ QUESTIONS 1-9: 0
SUM OF ALL RESPONSES TO PHQ9 QUESTIONS 1 & 2: 0
SUM OF ALL RESPONSES TO PHQ QUESTIONS 1-9: 0

## 2025-02-14 NOTE — PROGRESS NOTES
Family Medicine Follow-Up Progress Note   [facility]    Patient Sobia Hermosillo  1985, 39 y.o., female  Encounter Date 02/14/25    ASSESSMENT AND PLAN:     Assessment & Plan      Assessment & Plan  Controlled type 2 diabetes mellitus with hyperglycemia, with long-term current use of insulin ([facility])   Improved. A1c 7.7 on 02/02/2025, previously 8.8. Monitors blood sugar at home, averaging 150 in the mornings. Monofilament test: slightly decreased sensation on plantar surface, rest normal.  - Increase to mounjaro 15 mg  - Prescription refill for Mounjaro 15 mg sent to [facility]  - Maintain current dietary practices  - Monitor blood sugar levels regularly      Orders:    Lipid Panel; Future    Albumin/Creatinine Ratio, Urine; Future    MOUNJARO 15 MG/0.5ML SOAJ; Inject 15 mg into the skin once a week     DIABETES FOOT EXAM    Essential hypertension, benign   Well-controlled. Not on lisinopril for over a year. Monitors blood pressure at home, stable during recent doctor visits.  - Continue home blood pressure monitoring    Orders:    MOUNJARO 15 MG/0.5ML SOAJ; Inject 15 mg into the skin once a week    Moderate persistent asthma without complication  Stable, no recent issues    Orders:    MOUNJARO 15 MG/0.5ML SOAJ; Inject 15 mg into the skin once a week    Adhesive capsulitis of left shoulder  Severe. Scheduled for surgery on 02/26/2025 at [facility]. Diabetes may have contributed.  - Proceed with scheduled surgery  - Monitor post-surgery recovery and pain management       Class 2 severe obesity due to excess calories with serious comorbidity and body mass index (BMI) of 35.0 to 35.9 in adult  The patient is asked to make an attempt to improve diet and exercise patterns to aid in medical management of this problem.      Orders:    MOUNJARO 15 MG/0.5ML SOAJ; Inject 15 mg into the skin once a week    Encounter for screening mammogram for breast cancer  - Ordered regular mammogram  - Scheduled

## 2025-02-14 NOTE — ASSESSMENT & PLAN NOTE
The patient is asked to make an attempt to improve diet and exercise patterns to aid in medical management of this problem.      Orders:    MOUNJARO 15 MG/0.5ML SOAJ; Inject 15 mg into the skin once a week

## 2025-02-14 NOTE — ASSESSMENT & PLAN NOTE
Improved. A1c 7.7 on 02/02/2025, previously 8.8. Monitors blood sugar at home, averaging 150 in the mornings. Monofilament test: slightly decreased sensation on plantar surface, rest normal.  - Increase to mounjaro 15 mg  - Prescription refill for Mounjaro 15 mg sent to [facility]  - Maintain current dietary practices  - Monitor blood sugar levels regularly      Orders:    Lipid Panel; Future    Albumin/Creatinine Ratio, Urine; Future    MOUNJARO 15 MG/0.5ML SOAJ; Inject 15 mg into the skin once a week     DIABETES FOOT EXAM

## 2025-02-14 NOTE — PROGRESS NOTES
Chief Complaint   Patient presents with    Diabetes     \"Have you been to the ER, urgent care clinic since your last visit?  Hospitalized since your last visit?\"    NO    “Have you seen or consulted any other health care providers outside of [facility] since your last visit?”    NO    Have you had a mammogram?”   NO    No breast cancer screening on file      “Have you had a pap smear?”    NO    No cervical cancer screening on file             Click Here for Release of Records Request

## 2025-05-07 ENCOUNTER — PATIENT MESSAGE (OUTPATIENT)
Facility: CLINIC | Age: 40
End: 2025-05-07

## 2025-05-07 DIAGNOSIS — Z79.4 CONTROLLED TYPE 2 DIABETES MELLITUS WITH HYPERGLYCEMIA, WITH LONG-TERM CURRENT USE OF INSULIN (HCC): ICD-10-CM

## 2025-05-07 DIAGNOSIS — Z79.4 CONTROLLED TYPE 2 DIABETES MELLITUS WITH HYPERGLYCEMIA, WITH LONG-TERM CURRENT USE OF INSULIN (HCC): Primary | ICD-10-CM

## 2025-05-07 DIAGNOSIS — E11.65 CONTROLLED TYPE 2 DIABETES MELLITUS WITH HYPERGLYCEMIA, WITH LONG-TERM CURRENT USE OF INSULIN (HCC): ICD-10-CM

## 2025-05-07 DIAGNOSIS — E11.65 CONTROLLED TYPE 2 DIABETES MELLITUS WITH HYPERGLYCEMIA, WITH LONG-TERM CURRENT USE OF INSULIN (HCC): Primary | ICD-10-CM

## 2025-05-07 DIAGNOSIS — I10 ESSENTIAL HYPERTENSION, BENIGN: ICD-10-CM

## 2025-05-09 ENCOUNTER — RESULTS FOLLOW-UP (OUTPATIENT)
Facility: CLINIC | Age: 40
End: 2025-05-09

## 2025-05-09 LAB
ALBUMIN SERPL-MCNC: 4.4 G/DL (ref 3.9–4.9)
ALBUMIN/CREAT UR: 6 MG/G CREAT (ref 0–29)
ALP SERPL-CCNC: 70 IU/L (ref 44–121)
ALT SERPL-CCNC: 17 IU/L (ref 0–32)
AST SERPL-CCNC: 17 IU/L (ref 0–40)
BASOPHILS # BLD AUTO: 0 X10E3/UL (ref 0–0.2)
BASOPHILS NFR BLD AUTO: 0 %
BILIRUB SERPL-MCNC: 0.5 MG/DL (ref 0–1.2)
BUN SERPL-MCNC: 11 MG/DL (ref 6–24)
BUN/CREAT SERPL: 14 (ref 9–23)
CALCIUM SERPL-MCNC: 9.4 MG/DL (ref 8.7–10.2)
CHLORIDE SERPL-SCNC: 102 MMOL/L (ref 96–106)
CHOLEST SERPL-MCNC: 154 MG/DL (ref 100–199)
CO2 SERPL-SCNC: 20 MMOL/L (ref 20–29)
CREAT SERPL-MCNC: 0.77 MG/DL (ref 0.57–1)
CREAT UR-MCNC: 208.6 MG/DL
EGFRCR SERPLBLD CKD-EPI 2021: 100 ML/MIN/1.73
EOSINOPHIL # BLD AUTO: 0.1 X10E3/UL (ref 0–0.4)
EOSINOPHIL NFR BLD AUTO: 1 %
ERYTHROCYTE [DISTWIDTH] IN BLOOD BY AUTOMATED COUNT: 13.4 % (ref 11.7–15.4)
GLOBULIN SER CALC-MCNC: 2.4 G/DL (ref 1.5–4.5)
GLUCOSE SERPL-MCNC: 88 MG/DL (ref 70–99)
HBA1C MFR BLD: 6.7 % (ref 4.8–5.6)
HCT VFR BLD AUTO: 44.9 % (ref 34–46.6)
HDLC SERPL-MCNC: 41 MG/DL
HGB BLD-MCNC: 14.8 G/DL (ref 11.1–15.9)
IMM GRANULOCYTES # BLD AUTO: 0 X10E3/UL (ref 0–0.1)
IMM GRANULOCYTES NFR BLD AUTO: 0 %
LDLC SERPL CALC-MCNC: 97 MG/DL (ref 0–99)
LYMPHOCYTES # BLD AUTO: 2.2 X10E3/UL (ref 0.7–3.1)
LYMPHOCYTES NFR BLD AUTO: 28 %
MCH RBC QN AUTO: 28.6 PG (ref 26.6–33)
MCHC RBC AUTO-ENTMCNC: 33 G/DL (ref 31.5–35.7)
MCV RBC AUTO: 87 FL (ref 79–97)
MICROALBUMIN UR-MCNC: 11.8 UG/ML
MONOCYTES # BLD AUTO: 0.4 X10E3/UL (ref 0.1–0.9)
MONOCYTES NFR BLD AUTO: 4 %
NEUTROPHILS # BLD AUTO: 5.3 X10E3/UL (ref 1.4–7)
NEUTROPHILS NFR BLD AUTO: 67 %
PLATELET # BLD AUTO: 309 X10E3/UL (ref 150–450)
POTASSIUM SERPL-SCNC: 4.1 MMOL/L (ref 3.5–5.2)
PROT SERPL-MCNC: 6.8 G/DL (ref 6–8.5)
RBC # BLD AUTO: 5.17 X10E6/UL (ref 3.77–5.28)
SODIUM SERPL-SCNC: 139 MMOL/L (ref 134–144)
TRIGL SERPL-MCNC: 81 MG/DL (ref 0–149)
VLDLC SERPL CALC-MCNC: 16 MG/DL (ref 5–40)
WBC # BLD AUTO: 8 X10E3/UL (ref 3.4–10.8)

## 2025-05-10 LAB
IMP & REVIEW OF LAB RESULTS: NORMAL
Lab: NORMAL

## 2025-05-29 ENCOUNTER — PATIENT MESSAGE (OUTPATIENT)
Facility: CLINIC | Age: 40
End: 2025-05-29

## 2025-05-29 DIAGNOSIS — Z79.4 UNCONTROLLED TYPE 2 DIABETES MELLITUS WITH HYPERGLYCEMIA, WITH LONG-TERM CURRENT USE OF INSULIN (HCC): ICD-10-CM

## 2025-05-29 DIAGNOSIS — E11.65 UNCONTROLLED TYPE 2 DIABETES MELLITUS WITH HYPERGLYCEMIA, WITH LONG-TERM CURRENT USE OF INSULIN (HCC): ICD-10-CM

## 2025-05-29 RX ORDER — BLOOD-GLUCOSE METER
1 EACH MISCELLANEOUS DAILY
Qty: 1 KIT | Refills: 0 | Status: SHIPPED | OUTPATIENT
Start: 2025-05-29

## 2025-05-29 RX ORDER — ACYCLOVIR 400 MG/1
1 TABLET ORAL
Qty: 3 EACH | Refills: 12 | Status: SHIPPED | OUTPATIENT
Start: 2025-05-29

## 2025-05-29 RX ORDER — AVOBENZONE, HOMOSALATE, OCTISALATE, OCTOCRYLENE 30; 40; 45; 26 MG/ML; MG/ML; MG/ML; MG/ML
1 CREAM TOPICAL DAILY
Qty: 100 EACH | Refills: 5 | Status: SHIPPED | OUTPATIENT
Start: 2025-05-29

## 2025-05-30 ENCOUNTER — TELEPHONE (OUTPATIENT)
Facility: CLINIC | Age: 40
End: 2025-05-30

## 2025-05-30 NOTE — TELEPHONE ENCOUNTER
ONETOUCH ULTRA2 GLUCOSE SYST for the  [facility] member identified above and has approved the request as follows:  ONETOUCH ULTRA2 GLUCOSE SYST: quantity/billable units of 1 , approved for 05/30/2025  through 05/30/2026      ONETOUCH ULTRA TEST STRIP for the  [facility] member identified above and has approved the request as follows:  ONETOUCH ULTRA TEST STRIP: quantity/billable units of 100 , approved for 05/30/2025 through  05/30/2026

## 2025-06-06 DIAGNOSIS — E66.812 CLASS 2 SEVERE OBESITY DUE TO EXCESS CALORIES WITH SERIOUS COMORBIDITY AND BODY MASS INDEX (BMI) OF 35.0 TO 35.9 IN ADULT (HCC): ICD-10-CM

## 2025-06-06 DIAGNOSIS — I10 ESSENTIAL HYPERTENSION, BENIGN: ICD-10-CM

## 2025-06-06 DIAGNOSIS — E11.65 CONTROLLED TYPE 2 DIABETES MELLITUS WITH HYPERGLYCEMIA, WITH LONG-TERM CURRENT USE OF INSULIN (HCC): ICD-10-CM

## 2025-06-06 DIAGNOSIS — J45.40 MODERATE PERSISTENT ASTHMA WITHOUT COMPLICATION: ICD-10-CM

## 2025-06-06 DIAGNOSIS — E66.01 CLASS 2 SEVERE OBESITY DUE TO EXCESS CALORIES WITH SERIOUS COMORBIDITY AND BODY MASS INDEX (BMI) OF 35.0 TO 35.9 IN ADULT (HCC): ICD-10-CM

## 2025-06-06 DIAGNOSIS — Z79.4 CONTROLLED TYPE 2 DIABETES MELLITUS WITH HYPERGLYCEMIA, WITH LONG-TERM CURRENT USE OF INSULIN (HCC): ICD-10-CM

## 2025-06-06 RX ORDER — TIRZEPATIDE 15 MG/.5ML
15 INJECTION, SOLUTION SUBCUTANEOUS WEEKLY
Qty: 2 ML | Refills: 3 | Status: SHIPPED | OUTPATIENT
Start: 2025-06-06

## 2025-06-06 NOTE — TELEPHONE ENCOUNTER
[facility] faxed request  [place]     MOUNJARO 15MG/0.5ML INJ  QTY 4    Inject 1 syringe subcutaneously once a week

## 2025-06-23 ENCOUNTER — TRANSCRIBE ORDERS (OUTPATIENT)
Facility: HOSPITAL | Age: 40
End: 2025-06-23

## 2025-06-23 ENCOUNTER — HOSPITAL ENCOUNTER (OUTPATIENT)
Facility: HOSPITAL | Age: 40
Discharge: HOME | End: 2025-06-26
Payer: MEDICAID

## 2025-06-23 DIAGNOSIS — Z12.31 OTHER SCREENING MAMMOGRAM: ICD-10-CM

## 2025-06-23 DIAGNOSIS — Z12.31 OTHER SCREENING MAMMOGRAM: Primary | ICD-10-CM

## 2025-06-23 PROCEDURE — 77067 SCR MAMMO BI INCL CAD: CPT

## 2025-08-14 ENCOUNTER — TELEMEDICINE (OUTPATIENT)
Facility: CLINIC | Age: 40
End: 2025-08-14
Payer: MEDICAID

## 2025-08-14 DIAGNOSIS — I10 ESSENTIAL HYPERTENSION, BENIGN: ICD-10-CM

## 2025-08-14 DIAGNOSIS — R53.83 OTHER FATIGUE: ICD-10-CM

## 2025-08-14 DIAGNOSIS — Z79.4 CONTROLLED TYPE 2 DIABETES MELLITUS WITH HYPERGLYCEMIA, WITH LONG-TERM CURRENT USE OF INSULIN (HCC): Primary | ICD-10-CM

## 2025-08-14 DIAGNOSIS — E66.01 CLASS 2 SEVERE OBESITY DUE TO EXCESS CALORIES WITH SERIOUS COMORBIDITY AND BODY MASS INDEX (BMI) OF 35.0 TO 35.9 IN ADULT (HCC): ICD-10-CM

## 2025-08-14 DIAGNOSIS — Z79.4 CONTROLLED TYPE 2 DIABETES MELLITUS WITH HYPERGLYCEMIA, WITH LONG-TERM CURRENT USE OF INSULIN (HCC): ICD-10-CM

## 2025-08-14 DIAGNOSIS — E11.65 CONTROLLED TYPE 2 DIABETES MELLITUS WITH HYPERGLYCEMIA, WITH LONG-TERM CURRENT USE OF INSULIN (HCC): ICD-10-CM

## 2025-08-14 DIAGNOSIS — Z98.890 HISTORY OF ENDOMETRIAL ABLATION: ICD-10-CM

## 2025-08-14 DIAGNOSIS — E66.812 CLASS 2 SEVERE OBESITY DUE TO EXCESS CALORIES WITH SERIOUS COMORBIDITY AND BODY MASS INDEX (BMI) OF 35.0 TO 35.9 IN ADULT (HCC): ICD-10-CM

## 2025-08-14 DIAGNOSIS — J45.40 MODERATE PERSISTENT ASTHMA WITHOUT COMPLICATION: ICD-10-CM

## 2025-08-14 DIAGNOSIS — E11.65 CONTROLLED TYPE 2 DIABETES MELLITUS WITH HYPERGLYCEMIA, WITH LONG-TERM CURRENT USE OF INSULIN (HCC): Primary | ICD-10-CM

## 2025-08-14 PROCEDURE — 3044F HG A1C LEVEL LT 7.0%: CPT | Performed by: FAMILY MEDICINE

## 2025-08-14 PROCEDURE — 99214 OFFICE O/P EST MOD 30 MIN: CPT | Performed by: FAMILY MEDICINE

## 2025-08-14 RX ORDER — INSULIN GLARGINE 300 U/ML
20 INJECTION, SOLUTION SUBCUTANEOUS NIGHTLY
Qty: 15 ML | Refills: 1 | Status: SHIPPED | OUTPATIENT
Start: 2025-08-14

## 2025-08-14 RX ORDER — TIRZEPATIDE 15 MG/.5ML
15 INJECTION, SOLUTION SUBCUTANEOUS WEEKLY
Qty: 2 ML | Refills: 3 | Status: SHIPPED | OUTPATIENT
Start: 2025-08-14

## (undated) DEVICE — COVER MPLR TIP CRV SCIS ACC DA VINCI

## (undated) DEVICE — ARM DRAPE

## (undated) DEVICE — COVER,MAYO STAND,STERILE: Brand: MEDLINE

## (undated) DEVICE — DEVICE TRNSF SPIK STL 2008S] MICROTEK MEDICAL INC]

## (undated) DEVICE — SUTURE SZ 0 27IN 5/8 CIR UR-6  TAPER PT VIOLET ABSRB VICRYL J603H

## (undated) DEVICE — TOWEL SURG W17XL27IN STD BLU COT NONFENESTRATED PREWASHED

## (undated) DEVICE — SURGICAL PROCEDURE KIT GEN LAPAROSCOPY LF

## (undated) DEVICE — ANCHOR TISSUE RETRIEVAL SYSTEM, BAG SIZE 125 ML, PORT SIZE 8 MM: Brand: ANCHOR TISSUE RETRIEVAL SYSTEM

## (undated) DEVICE — REM POLYHESIVE ADULT PATIENT RETURN ELECTRODE: Brand: VALLEYLAB

## (undated) DEVICE — SUTURE VCRL SZ 4-0 L27IN ABSRB UD L19MM PS-2 3/8 CIR PRIM J426H

## (undated) DEVICE — INSUFFLATION NEEDLE: Brand: SURGINEEDLE

## (undated) DEVICE — STERILE POLYISOPRENE POWDER-FREE SURGICAL GLOVES: Brand: PROTEXIS

## (undated) DEVICE — CANISTER, RIGID, 3000CC: Brand: MEDLINE INDUSTRIES, INC.

## (undated) DEVICE — COVER LT HNDL PLAS RIG 1 PER PK

## (undated) DEVICE — INFECTION CONTROL KIT SYS

## (undated) DEVICE — STRAP,POSITIONING,KNEE/BODY,FOAM,4X60": Brand: MEDLINE

## (undated) DEVICE — ELECTRO LUBE IS A SINGLE PATIENT USE DEVICE THAT IS INTENDED TO BE USED ON ELECTROSURGICAL ELECTRODES TO REDUCE STICKING.: Brand: KEY SURGICAL ELECTRO LUBE

## (undated) DEVICE — NEEDLE HYPO 22GA L1.5IN BLK S STL HUB POLYPR SHLD REG BVL

## (undated) DEVICE — BLADELESS OBTURATOR: Brand: WECK VISTA

## (undated) DEVICE — DRAPE,REIN 53X77,STERILE: Brand: MEDLINE

## (undated) DEVICE — VISUALIZATION SYSTEM: Brand: CLEARIFY

## (undated) DEVICE — SOL IRRIGATION INJ NACL 0.9% 500ML BTL

## (undated) DEVICE — DERMABOND SKIN ADH 0.7ML -- DERMABOND ADVANCED 12/BX

## (undated) DEVICE — CLIP LIG ABSRB HEM-LOK LG PUR --

## (undated) DEVICE — SUTURE VCRL SZ 3-0 L27IN ABSRB UD L26MM SH 1/2 CIR J416H

## (undated) DEVICE — PREP SKN CHLRAPRP APL 26ML STR --

## (undated) DEVICE — SEAL UNIV 5-8MM DISP BX/10 -- DA VINCI XI - SNGL USE